# Patient Record
Sex: MALE | Race: WHITE | NOT HISPANIC OR LATINO | Employment: OTHER | URBAN - METROPOLITAN AREA
[De-identification: names, ages, dates, MRNs, and addresses within clinical notes are randomized per-mention and may not be internally consistent; named-entity substitution may affect disease eponyms.]

---

## 2018-05-21 ENCOUNTER — OFFICE VISIT (OUTPATIENT)
Dept: FAMILY MEDICINE | Facility: CLINIC | Age: 58
End: 2018-05-21
Payer: COMMERCIAL

## 2018-05-21 VITALS
WEIGHT: 184.4 LBS | HEIGHT: 74 IN | DIASTOLIC BLOOD PRESSURE: 75 MMHG | HEART RATE: 84 BPM | BODY MASS INDEX: 23.66 KG/M2 | SYSTOLIC BLOOD PRESSURE: 133 MMHG | TEMPERATURE: 98.7 F
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
## 2018-05-21 PROCEDURE — 99203 OFFICE O/P NEW LOW 30 MIN: CPT | Performed by: FAMILY MEDICINE

## 2018-05-21 RX ORDER — DOXYCYCLINE 100 MG/1
200 CAPSULE ORAL ONCE
Qty: 2 CAPSULE | Refills: 0 | Status: SHIPPED | OUTPATIENT
Start: 2018-05-21 | End: 2018-05-21

## 2018-05-21 NOTE — PATIENT INSTRUCTIONS
Start Doxycycline 200 mg orally x 1 dose. Take it close to dinner tonight.    Watch for symptoms of lyme disease still in the next 30 days or so.      Tick Bite, Antibiotic Treatment    Ticks are small arachnids that feed on the blood of rodents, rabbits, birds, deer, dogs and humans. The bite may cause a local reaction like that of a spider, with a small amount of local redness, itching and slight swelling. Sometimes there is no local reaction.  Most tick bites are harmless. But some ticks carry diseases, such as Lyme disease or Sergio Mountain spotted fever. These can be passed to people at the time of the bite. Lyme disease is of greatest concern. Right now you have no symptoms of Lyme disease or other serious reaction to the bite. It is important to watch for the warning signs, which could appear weeks to months after the tick bite.  Home care  The following guidelines can help you care for your bite at home:    If itching is a problem, avoid tight clothing and anything that heats up your skin. This includes hot showers or baths and direct sunlight. This often makes the itching worse.    An ice pack will reduce local areas of redness and itching. Make your own ice pack by putting ice cubes in a zip-top plastic bag and wrapping it in a thin towel. Over-the-counter creams containing benzocaine may help with itching.    You can use an antihistamine with diphenhydramine if your doctor did not give you another antihistamine. This medicine may be used to reduce itching if large areas of the skin are involved. It is available at drugstores and grocery stores. If symptoms continue, talk with your doctor or pharmacist about other over-the counter medicines that may be helpful.    Your doctor may prescribe antibiotics to reduce your risk of getting Lyme disease. It is very important that you take them exactly as directed until they are completely finished.  Follow-up care  Follow up with your healthcare provider, or as  advised.  Call 911  Call 911 if any of these occur:    Irregular or rapid heartbeat    Numbness, tingling, or weakness in the arms or legs  When to seek medical advice  Call your healthcare provider right away if any of these occur:  Signs of local infection. Watch for these during the next few days:    Increasing redness around the bite site    Increased pain or swelling    Fever over 100.4 F (38 C), or as directed by your healthcare provider    Fluid draining from the bite area  Signs of tick-related disease. Watch for these over the next few weeks to months:    Circular, red, ring-like rash appears at the bite area within 1 to 3 weeks    Tiredness, fever or chills, nausea or vomiting    Neck pain or stiffness, headache, or confusion    Muscle or bone aches    Joint pain or swelling, especially in the knee    Weakness on one side of the face  Date Last Reviewed: 10/1/2016    2074-4200 [employer]. [address] [place] [address]. All rights reserved. This information is not intended as a substitute for professional medical care. Always follow your healthcare professional's instructions.

## 2018-05-21 NOTE — MR AVS SNAPSHOT
After Visit Summary   5/21/2018    Noam Del Real    MRN: [id number]           Patient Information     Date Of Birth          1960        Visit Information        Provider Department      5/21/2018 6:20 PM Bandar Blue MD [facility]        Today's Diagnoses     Tick bite, initial encounter    -  1      Care Instructions    Start Doxycycline 200 mg orally x 1 dose. Take it close to dinner tonight.    Watch for symptoms of lyme disease still in the next 30 days or so.      Tick Bite, Antibiotic Treatment    Ticks are small arachnids that feed on the blood of rodents, rabbits, birds, deer, dogs and humans. The bite may cause a local reaction like that of a spider, with a small amount of local redness, itching and slight swelling. Sometimes there is no local reaction.  Most tick bites are harmless. But some ticks carry diseases, such as Lyme disease or Sergio Mountain spotted fever. These can be passed to people at the time of the bite. Lyme disease is of greatest concern. Right now you have no symptoms of Lyme disease or other serious reaction to the bite. It is important to watch for the warning signs, which could appear weeks to months after the tick bite.  Home care  The following guidelines can help you care for your bite at home:    If itching is a problem, avoid tight clothing and anything that heats up your skin. This includes hot showers or baths and direct sunlight. This often makes the itching worse.    An ice pack will reduce local areas of redness and itching. Make your own ice pack by putting ice cubes in a zip-top plastic bag and wrapping it in a thin towel. Over-the-counter creams containing benzocaine may help with itching.    You can use an antihistamine with diphenhydramine if your doctor did not give you another antihistamine. This medicine may be used to reduce itching if large areas of the skin are involved. It is available at drugstores and grocery  stores. If symptoms continue, talk with your doctor or pharmacist about other over-the counter medicines that may be helpful.    Your doctor may prescribe antibiotics to reduce your risk of getting Lyme disease. It is very important that you take them exactly as directed until they are completely finished.  Follow-up care  Follow up with your healthcare provider, or as advised.  Call 911  Call 911 if any of these occur:    Irregular or rapid heartbeat    Numbness, tingling, or weakness in the arms or legs  When to seek medical advice  Call your healthcare provider right away if any of these occur:  Signs of local infection. Watch for these during the next few days:    Increasing redness around the bite site    Increased pain or swelling    Fever over 100.4 F (38 C), or as directed by your healthcare provider    Fluid draining from the bite area  Signs of tick-related disease. Watch for these over the next few weeks to months:    Circular, red, ring-like rash appears at the bite area within 1 to 3 weeks    Tiredness, fever or chills, nausea or vomiting    Neck pain or stiffness, headache, or confusion    Muscle or bone aches    Joint pain or swelling, especially in the knee    Weakness on one side of the face  Date Last Reviewed: 10/1/2016    0906-4329 [employer]. [address] [place] [address]. All rights reserved. This information is not intended as a substitute for professional medical care. Always follow your healthcare professional's instructions.                Follow-ups after your visit        Follow-up notes from your care team     Return if symptoms worsen or fail to improve.      Who to contact     If you have questions or need follow up information about today's clinic visit or your schedule please contact [facility] directly at [phone].  Normal or non-critical lab and imaging results will be communicated to you by MyChart, letter or phone within 4 business  "days after the clinic has received the results. If you do not hear from us within 7 days, please contact the clinic through [employer] or phone. If you have a critical or abnormal lab result, we will notify you by phone as soon as possible.  Submit refill requests through [employer] or call your pharmacy and they will forward the refill request to us. Please allow 3 business days for your refill to be completed.          Additional Information About Your Visit        [employer] Information     [employer] lets you send messages to your doctor, view your test results, renew your prescriptions, schedule appointments and more. To sign up, go to [email or web address] . Click on \"Log in\" on the left side of the screen, which will take you to the Welcome page. Then click on \"Sign up Now\" on the right side of the page.     You will be asked to enter the access code listed below, as well as some personal information. Please follow the directions to create your username and password.     Your access code is: [email or web address]  Expires: 2018  6:30 PM     Your access code will  in 90 days. If you need help or a new code, please call your [facility] clinic or [phone].        Care EveryWhere ID     This is your Care EveryWhere ID. This could be used by other organizations to access your [facility] medical records  [id number]        Your Vitals Were     Pulse Temperature Height BMI (Body Mass Index)          84 98.7  F (37.1  C) (Tympanic) 6' 2.25\" (1.886 m) 23.52 kg/m2         Blood Pressure from Last 3 Encounters:   18 133/75   13 132/89    Weight from Last 3 Encounters:   18 184 lb 6.4 oz (83.6 kg)   13 188 lb (85.3 kg)              Today, you had the following     No orders found for display         Today's Medication Changes          These changes are accurate as of 18  6:30 PM.  If you have any questions, ask your nurse or doctor.               Start taking these medicines.        Dose/Directions "    doxycycline 100 MG capsule   Commonly known as:  VIBRAMYCIN   Used for:  Tick bite, initial encounter   Started by:  Bandar Blue MD        Dose:  200 mg   Take 2 capsules (200 mg) by mouth once for 1 dose   Quantity:  2 capsule   Refills:  0            Where to get your medicines      These medications were sent to [facility] - [place], [place] - [address] [facility]  [address] [facility] [address]     Phone:  [phone]     doxycycline 100 MG capsule                Primary Care Provider Office Phone # Fax #    Geovanni Alvarez [phone]       [address] [facility] [address]        Equal Access to Services     ALEX RIZVI : Hadii pallavi carroll hadchris Soze, waaxda luqadaha, qaybta kaalmada adeosmanyada, olivia roberto . So [facility] [phone].    ATENCIÓN: Si habla español, tiene a dean disposición servicios gratuitos de asistencia lingüística. Llame al [phone].    We comply with applicable federal civil rights laws and [place] laws. We do not discriminate on the basis of race, color, national origin, age, disability, sex, sexual orientation, or gender identity.            Thank you!     Thank you for choosing [facility]  for your care. Our goal is always to provide you with excellent care. Hearing back from our patients is one way we can continue to improve our services. Please take a few minutes to complete the written survey that you may receive in the mail after your visit with us. Thank you!             Your Updated Medication List - Protect others around you: Learn how to safely use, store and throw away your medicines at www.disposemymeds.org.          This list is accurate as of 5/21/18  6:30 PM.  Always use your most recent med list.                   Brand Name Dispense Instructions for use Diagnosis    doxycycline 100 MG capsule    VIBRAMYCIN    2 capsule    Take 2 capsules (200 mg) by mouth once for 1 dose     Tick bite, initial encounter       NO ACTIVE MEDICATIONS

## 2018-05-21 NOTE — PROGRESS NOTES
"  SUBJECTIVE:   Noam Del Real is a 57 year old male who presents to clinic today for the following health issues:  Chief Complaint   Patient presents with     Insect Bites     Pt here for tick bite.         Tick bite      Duration: pt found about 1 hr ago    Description (location/character/radiation): under right armpit, redness and swelling, might still be the head in there    Intensity:  Mild soreness    Accompanying signs and symptoms: redness and swelling    History (similar episodes/previous evaluation): None    Precipitating or alleviating factors: None    Therapies tried and outcome: None     Verified above history with patient.  Patient states unsure how long the tick was attached.  Patient described tick as \"tiny little maged\".    Patient states he removed it by grabbing between fingernails and pulling.       Problem list and histories reviewed & adjusted, as indicated.  Additional history: as documented    Patient Active Problem List   Diagnosis     Marijuana smoker (H)     Past Surgical History:   Procedure Laterality Date     C ANESTH,LOWER ARM SURGERY  2002    Left     DENTAL SURGERY      [place]        Social History   Substance Use Topics     Smoking status: Never Smoker     Smokeless tobacco: Never Used     Alcohol use Yes      Comment: Rarely     Family History   Problem Relation Age of Onset     Prostate Cancer Father      Removal of prostate for elevated PSA. Unsure of cancer.          Current Outpatient Prescriptions   Medication Sig Dispense Refill     doxycycline (VIBRAMYCIN) 100 MG capsule Take 2 capsules (200 mg) by mouth once for 1 dose 2 capsule 0     NO ACTIVE MEDICATIONS        No Known Allergies    Reviewed and updated as needed this visit by clinical staff  Tobacco  Allergies  Med Hx  Surg Hx  Fam Hx  Soc Hx      Reviewed and updated as needed this visit by Provider         ROS:  C: NEGATIVE for fever, chills, change in weight  INTEGUMENTARY/SKIN: see above  MUSCULOSKELETAL: see " "above  H: NEGATIVE for bleeding problems    OBJECTIVE:                                                    /75  Pulse 84  Temp 98.7  F (37.1  C) (Tympanic)  Ht 6' 2.25\" (1.886 m)  Wt 184 lb 6.4 oz (83.6 kg)  BMI 23.52 kg/m2  Body mass index is 23.52 kg/(m^2).  GEN: alert, oriented x 3, NAD  SKIN: good turgor; on the right lateral chest, patient has erythematous papule with central 2 mm hemorrhagic puncta with no visible or palpable retained FB or tick fragment, and no surrounding erythematous ring  Diagnostic test results:  Diagnostic Test Results:  none      ASSESSMENT/PLAN:                                                        ICD-10-CM    1. Tick bite, initial encounter W57.XXXA doxycycline (VIBRAMYCIN) 100 MG capsule     Patient has likely deer tick bite, likely more than 24 hours and wihtin the last 72 hours.   Discussed lyme prophylaxis and patient concurred.  Advised to observe for target rash development, fever, chills, increased joint pains or other new symptoms in next few weeks; if present return to clinic for reevaluation.  Advised full body tick exam when outdoors for long periods.      Follow up with Provider - prn   Patient Instructions   Start Doxycycline 200 mg orally x 1 dose. Take it close to dinner tonight.    Watch for symptoms of lyme disease still in the next 30 days or so.      Tick Bite, Antibiotic Treatment    Ticks are small arachnids that feed on the blood of rodents, rabbits, birds, deer, dogs and humans. The bite may cause a local reaction like that of a spider, with a small amount of local redness, itching and slight swelling. Sometimes there is no local reaction.  Most tick bites are harmless. But some ticks carry diseases, such as Lyme disease or Sergio Mountain spotted fever. These can be passed to people at the time of the bite. Lyme disease is of greatest concern. Right now you have no symptoms of Lyme disease or other serious reaction to the bite. It is important to " watch for the warning signs, which could appear weeks to months after the tick bite.  Home care  The following guidelines can help you care for your bite at home:    If itching is a problem, avoid tight clothing and anything that heats up your skin. This includes hot showers or baths and direct sunlight. This often makes the itching worse.    An ice pack will reduce local areas of redness and itching. Make your own ice pack by putting ice cubes in a zip-top plastic bag and wrapping it in a thin towel. Over-the-counter creams containing benzocaine may help with itching.    You can use an antihistamine with diphenhydramine if your doctor did not give you another antihistamine. This medicine may be used to reduce itching if large areas of the skin are involved. It is available at drugstores and grocery stores. If symptoms continue, talk with your doctor or pharmacist about other over-the counter medicines that may be helpful.    Your doctor may prescribe antibiotics to reduce your risk of getting Lyme disease. It is very important that you take them exactly as directed until they are completely finished.  Follow-up care  Follow up with your healthcare provider, or as advised.  Call 911  Call 911 if any of these occur:    Irregular or rapid heartbeat    Numbness, tingling, or weakness in the arms or legs  When to seek medical advice  Call your healthcare provider right away if any of these occur:  Signs of local infection. Watch for these during the next few days:    Increasing redness around the bite site    Increased pain or swelling    Fever over 100.4 F (38 C), or as directed by your healthcare provider    Fluid draining from the bite area  Signs of tick-related disease. Watch for these over the next few weeks to months:    Circular, red, ring-like rash appears at the bite area within 1 to 3 weeks    Tiredness, fever or chills, nausea or vomiting    Neck pain or stiffness, headache, or confusion    Muscle or bone  aches    Joint pain or swelling, especially in the knee    Weakness on one side of the face  Date Last Reviewed: 10/1/2016    2393-4606 [employer]. [address] [place] [address], [place] [address]. All rights reserved. This information is not intended as a substitute for professional medical care. Always follow your healthcare professional's instructions.            Bandar Blue MD  [facility]

## 2018-07-30 ENCOUNTER — TELEPHONE (OUTPATIENT)
Dept: FAMILY MEDICINE | Facility: CLINIC | Age: 58
End: 2018-07-30

## 2018-07-30 NOTE — LETTER
[facility]  [address] [facility] [address]  [phone]      July 30, 2018      Noam Del Real  [address] [place] [address]    Dear Noam,    I care about your health and have reviewed your health plan. I have reviewed your medical conditions, medication list, and lab results and am making recommendations based on this review, to better manage your health.    You are in particular need of attention regarding:  -Colon Cancer Screening    I am recommending that you:  -schedule a COLONOSCOPY to look for colon cancer (due every 10 years or 5 years in higher risk situations.)   Colon cancer is now the second leading cause of death in the [place] for both men and women and there are over 130,000 new cases and 50,000 deaths per year from colon cancer.  Colonoscopies can prevent 90-95% of these deaths.  Problem lesions can be removed before they ever become cancer.  This test is not only looking for cancer, but also getting rid of precancerious lesions.  If you do not wish to do a colonoscopy or cannot afford to do one, at this time, there is another option. It is called a FIT test or Fecal Immunochemical Occult Blood Test (take home stool sample kit).  It does not replace the colonoscopy for colorectal cancer screening, but it can detect hidden bleeding in the lower colon.  It does need to be repeated every year and if a positive result is obtained, you would be referred for a colonoscopy.  If you have completed either one of these tests at another facility, please have the records sent to our clinic so that we can best coordinate your care.    Here is a list of Health Maintenance topics that are due now or due soon:  Health Maintenance Due   Topic Date Due     PHQ-2 Q1 YR  11/05/1973     HIV SCREEN (SYSTEM ASSIGNED)  11/05/1979     HEPATITIS C SCREENING  11/05/1979     COLON CANCER SCREEN (SYSTEM ASSIGNED)  11/05/2011     LIPID MONITORING Q1 YEAR  01/12/2014      TETANUS IMMUNIZATION (SYSTEM ASSIGNED)  01/01/2016     ADVANCE DIRECTIVE PLANNING Q5 YRS  11/05/2016       Please call us at [phone] (or use [employer]) to address the above recommendations.     Thank you for trusting [facility] and we appreciate the opportunity to serve you.  We look forward to supporting your healthcare needs in the future.    Healthy Regards,    Dr. Bandar Blue & Care Team/  cma

## 2018-07-30 NOTE — TELEPHONE ENCOUNTER
Panel Management Review    Composite cancer screening  Chart review shows that this patient is due/due soon for the following Colonoscopy/Fecal Colorectal (FIT)  Summary:    Patient is due/failing the following:   COLONOSCOPY/FIT    Action needed:   Patient needs referral/order: colonoscopy/FIT test    Type of outreach:    Sent letter.    Questions for provider review:    None                                                                                                                                    Laura Fischer CMA..........7/30/2018 4:44 PM

## 2018-08-30 ENCOUNTER — OFFICE VISIT (OUTPATIENT)
Dept: FAMILY MEDICINE | Facility: CLINIC | Age: 58
End: 2018-08-30
Payer: COMMERCIAL

## 2018-08-30 VITALS
SYSTOLIC BLOOD PRESSURE: 154 MMHG | TEMPERATURE: 97.4 F | WEIGHT: 179 LBS | HEIGHT: 74 IN | HEART RATE: 64 BPM | DIASTOLIC BLOOD PRESSURE: 89 MMHG | OXYGEN SATURATION: 100 % | BODY MASS INDEX: 22.97 KG/M2

## 2018-08-30 DIAGNOSIS — J06.9 VIRAL UPPER RESPIRATORY TRACT INFECTION WITH COUGH: Primary | ICD-10-CM

## 2018-08-30 PROCEDURE — 99213 OFFICE O/P EST LOW 20 MIN: CPT | Performed by: PHYSICIAN ASSISTANT

## 2018-08-30 NOTE — PATIENT INSTRUCTIONS
Viral Upper Respiratory Illness (Adult)  You have a viral upper respiratory illness (URI), which is another term for the common cold. This illness is contagious during the first few days. It is spread through the air by coughing and sneezing. It may also be spread by direct contact (touching the sick person and then touching your own eyes, nose, or mouth). Frequent handwashing will decrease risk of spread. Most viral illnesses go away within 7 to 10 days with rest and simple home remedies. Sometimes the illness may last for several weeks. Antibiotics will not kill a virus, and they are generally not prescribed for this condition.    Home care    If symptoms are severe, rest at home for the first 2 to 3 days. When you resume activity, don't let yourself get too tired.    Avoid being exposed to cigarette smoke (yours or others ).    You may use acetaminophen or ibuprofen to control pain and fever, unless another medicine was prescribed. If you have chronic liver or kidney disease, have ever had a stomach ulcer or gastrointestinal bleeding, or are taking blood-thinning medicines, talk with your healthcare provider before using these medicines. Aspirin should never be given to anyone under 18 years of age who is ill with a viral infection or fever. It may cause severe liver or brain damage.    Your appetite may be poor, so a light diet is fine. Avoid dehydration by drinking 6 to 8 glasses of fluids per day (water, soft drinks, juices, tea, or soup). Extra fluids will help loosen secretions in the nose and lungs.    Over-the-counter cold medicines will not shorten the length of time you re sick, but they may be helpful for the following symptoms: cough, sore throat, and nasal and sinus congestion. (Note: Do not use decongestants if you have high blood pressure.)  Follow-up care  Follow up with your healthcare provider, or as advised.  When to seek medical advice  Call your healthcare provider right away if any of these  occur:    Cough with lots of colored sputum (mucus)    Severe headache; face, neck, or ear pain    Difficulty swallowing due to throat pain    Fever of 100.4 F (38 C) or higher, or as directed by your healthcare provider  Call 911  Call 911 if any of these occur:    Chest pain, shortness of breath, wheezing, or difficulty breathing    Coughing up blood    Inability to swallow due to throat pain  Date Last Reviewed: 9/13/2015 2000-2017 [employer]. [address] [place] [address]. All rights reserved. This information is not intended as a substitute for professional medical care. Always follow your healthcare professional's instructions.

## 2018-08-30 NOTE — PROGRESS NOTES
"SUBJECTIVE:                                                    Noam Del Real is a 57 year old male who presents to clinic today for the following health issues:             Symptoms: cc Present Absent Comment   Fever/Chills       Fatigue x      Headache  x  Mild    Muscle or Body  Aches   x Sunday body aches   Eye Irritation   x    Sneezing   x    Nasal Eyal/Drg x      Sinus Pressure/Pain   x    Dental pain   x    Sore Throat x      Swollen Glands   x    Ear Pain/Fullness   x    Cough x      Wheeze   x    Chest Discomfort   x    Shortness of breath  x     Abdominal pain   x    Emesis    x Vomited once on monday   Diarrhea   x    Other   x      Symptom duration:  1 week, increasing the last few days.    Symptom severity:     Treatments tried:  allegra, benadryl    Contacts:  no         Problem list and histories reviewed & adjusted, as indicated.  Additional history: None    ROS:  Constitutional, HEENT, cardiovascular, pulmonary, gi and gu systems are negative, except as otherwise noted.    OBJECTIVE:                                                    /89  Pulse 64  Temp 97.4  F (36.3  C) (Tympanic)  Ht 6' 2.25\" (1.886 m)  Wt 179 lb (81.2 kg)  SpO2 100%  BMI 22.83 kg/m2 Body mass index is 22.83 kg/(m^2).   GEN: Well developed, well nourished in NAD.  HEENT: Normocephalic. Eyes: + conjunctival flushing noted. EARS: TMs WNL, Canals clear.  Nose: Edematous mucosa without lesion. Clear rhinorrhea. Mouth/Pharynx: No cobblestoning and telangiectasia. No Percussed Sinus tenderness.  NECK: Supple with no anterior cervical lymphadenopathy.  No Thyromegaly  RESP: CTA with good air entry all fields.  SKIN: No Exanthem noted.         ASSESSMENT/PLAN:                                                    1. Viral upper respiratory tract infection with cough    Continue supportive OTC measures.  Follow up if symptoms should persist, change or worsen.  Patient amenable to this follow up plan.       Mirna ELLSWORTH " [facility]

## 2018-08-30 NOTE — MR AVS SNAPSHOT
After Visit Summary   8/30/2018    Noam Del Real    MRN: [id number]           Patient Information     Date Of Birth          1960        Visit Information        Provider Department      8/30/2018 9:20 AM Karon Posada PA-C [facility]        Today's Diagnoses     Viral upper respiratory tract infection with cough    -  1      Care Instructions      Viral Upper Respiratory Illness (Adult)  You have a viral upper respiratory illness (URI), which is another term for the common cold. This illness is contagious during the first few days. It is spread through the air by coughing and sneezing. It may also be spread by direct contact (touching the sick person and then touching your own eyes, nose, or mouth). Frequent handwashing will decrease risk of spread. Most viral illnesses go away within 7 to 10 days with rest and simple home remedies. Sometimes the illness may last for several weeks. Antibiotics will not kill a virus, and they are generally not prescribed for this condition.    Home care    If symptoms are severe, rest at home for the first 2 to 3 days. When you resume activity, don't let yourself get too tired.    Avoid being exposed to cigarette smoke (yours or others ).    You may use acetaminophen or ibuprofen to control pain and fever, unless another medicine was prescribed. If you have chronic liver or kidney disease, have ever had a stomach ulcer or gastrointestinal bleeding, or are taking blood-thinning medicines, talk with your healthcare provider before using these medicines. Aspirin should never be given to anyone under 18 years of age who is ill with a viral infection or fever. It may cause severe liver or brain damage.    Your appetite may be poor, so a light diet is fine. Avoid dehydration by drinking 6 to 8 glasses of fluids per day (water, soft drinks, juices, tea, or soup). Extra fluids will help loosen secretions in the nose and lungs.    Over-the-counter  cold medicines will not shorten the length of time you re sick, but they may be helpful for the following symptoms: cough, sore throat, and nasal and sinus congestion. (Note: Do not use decongestants if you have high blood pressure.)  Follow-up care  Follow up with your healthcare provider, or as advised.  When to seek medical advice  Call your healthcare provider right away if any of these occur:    Cough with lots of colored sputum (mucus)    Severe headache; face, neck, or ear pain    Difficulty swallowing due to throat pain    Fever of 100.4 F (38 C) or higher, or as directed by your healthcare provider  Call 911  Call 911 if any of these occur:    Chest pain, shortness of breath, wheezing, or difficulty breathing    Coughing up blood    Inability to swallow due to throat pain  Date Last Reviewed: 9/13/2015 2000-2017 [employer]. [address] [place] [address]. All rights reserved. This information is not intended as a substitute for professional medical care. Always follow your healthcare professional's instructions.                Follow-ups after your visit        Who to contact     Normal or non-critical lab and imaging results will be communicated to you by MyChart, letter or phone within 4 business days after the clinic has received the results. If you do not hear from us within 7 days, please contact the clinic through MyChart or phone. If you have a critical or abnormal lab result, we will notify you by phone as soon as possible.  Submit refill requests through [employer] or call your pharmacy and they will forward the refill request to us. Please allow 3 business days for your refill to be completed.          If you need to speak with a  for additional information , please call: [phone]             Additional Information About Your Visit        Care EveryWhere ID     This is your Care EveryWhere ID. This could be used by other organizations to access your  "Printer medical records  [id number]        Your Vitals Were     Pulse Temperature Height Pulse Oximetry BMI (Body Mass Index)       64 97.4  F (36.3  C) (Tympanic) 6' 2.25\" (1.886 m) 100% 22.83 kg/m2        Blood Pressure from Last 3 Encounters:   08/30/18 154/89   05/21/18 133/75   01/03/13 132/89    Weight from Last 3 Encounters:   08/30/18 179 lb (81.2 kg)   05/21/18 184 lb 6.4 oz (83.6 kg)   01/03/13 188 lb (85.3 kg)              Today, you had the following     No orders found for display       Primary Care Provider Office Phone # Fax #    Geovanni Alvarez [phone] [address] [facility] [address]        Equal Access to Services     [facility]: Hadii pallavi carroll hadasho Soomaali, waaxda luqadaha, qaybta kaalmada adeegyada, olivia arredondo haymercy roberto . So [facility] [phone].    ATENCIÓN: Si habla español, tiene a dean disposición servicios gratuitos de asistencia lingüística. Llame al [phone].    We comply with applicable federal civil rights laws and [place] laws. We do not discriminate on the basis of race, color, national origin, age, disability, sex, sexual orientation, or gender identity.            Thank you!     Thank you for choosing [facility]  for your care. Our goal is always to provide you with excellent care. Hearing back from our patients is one way we can continue to improve our services. Please take a few minutes to complete the written survey that you may receive in the mail after your visit with us. Thank you!             Your Updated Medication List - Protect others around you: Learn how to safely use, store and throw away your medicines at www.disposemymeds.org.          This list is accurate as of 8/30/18  9:41 AM.  Always use your most recent med list.                   Brand Name Dispense Instructions for use Diagnosis    NO ACTIVE MEDICATIONS             "

## 2019-02-19 ENCOUNTER — TELEPHONE (OUTPATIENT)
Dept: FAMILY MEDICINE | Facility: CLINIC | Age: 59
End: 2019-02-19

## 2019-02-19 NOTE — TELEPHONE ENCOUNTER
Panel Management Review      Composite cancer screening  Chart review shows that this patient is due/due soon for the following Colonoscopy and Fecal Colorectal (FIT)  Summary:    Patient is due/failing the following:   COLONOSCOPY and FIT    Action needed:   Patient needs referral/order: colonoscopy/fit test    Type of outreach:    Sent letter.    Questions for provider review:    None                                                                                                                                    Miri Viera CMA

## 2019-02-19 NOTE — LETTER
February 19, 2019      Noam Del Real  [address] [place] [id number]          At this time you are due for a Colon Cancer Screening. Here is some information regarding this testing.     Recommended every 5-10 years, depending on your history, in order to prevent and detect colon cancer at its earliest stages.  Colon cancer is now the second leading cause of death in the [place] for both men and women and there are over 130,000 new cases and 50,000 deaths per year from colon cancer.  Colonoscopies can prevent 90-95% of these deaths.  Problem lesions can be removed before they ever become cancer. This test is not only looking for cancer, but also getting rid of precancerious lesions. You are usually given some sedation which makes the test very comfortable for most people.      If you do not wish to do a colonoscopy or cannot afford to do one, at this time, there is another option. It is called a FIT test or Fecal Immunochemical Occult Blood Test (take home stool sample kit).  It does not replace the colonoscopy for colorectal cancer screening, but it can detect hidden bleeding in the lower colon.  It does need to be repeated every year and if a positive result is obtained, you would be referred for a colonoscopy. The FIT test is really easy to do and does not require any  diet or medication restrictions and involves only one collection sample.      If you have completed either one of these tests or had a flexible sigmoidoscopy in the past five years at another facility, please have the records sent to our clinic so that we can best coordinate your care and update your chart.  Please call us if you have questions or would like arrange either to do a colonoscopy or obtain the necessary test kit for the Fecal Occult Test.      Sincerely,        Bandar Blue MD

## 2019-04-16 ENCOUNTER — OFFICE VISIT (OUTPATIENT)
Dept: DERMATOLOGY | Facility: CLINIC | Age: 59
End: 2019-04-16
Payer: COMMERCIAL

## 2019-04-16 VITALS — OXYGEN SATURATION: 98 % | DIASTOLIC BLOOD PRESSURE: 93 MMHG | HEART RATE: 71 BPM | SYSTOLIC BLOOD PRESSURE: 148 MMHG

## 2019-04-16 DIAGNOSIS — D48.5 NEOPLASM OF UNCERTAIN BEHAVIOR OF SKIN: Primary | ICD-10-CM

## 2019-04-16 DIAGNOSIS — L82.1 SEBORRHEIC KERATOSIS: ICD-10-CM

## 2019-04-16 PROCEDURE — 11102 TANGNTL BX SKIN SINGLE LES: CPT | Performed by: PHYSICIAN ASSISTANT

## 2019-04-16 PROCEDURE — 88305 TISSUE EXAM BY PATHOLOGIST: CPT | Mod: TC | Performed by: PHYSICIAN ASSISTANT

## 2019-04-16 PROCEDURE — 99203 OFFICE O/P NEW LOW 30 MIN: CPT | Mod: 25 | Performed by: PHYSICIAN ASSISTANT

## 2019-04-16 NOTE — PROGRESS NOTES
Noam Del Real is a 58 year old year old male patient here today for black spot on back. Noticed it yesterday. Denies any pain or bleeding. He notes a long history of sun exposure growing up in [place]. Patient has no other skin complaints today.  Remainder of the HPI, Meds, PMH, Allergies, FH, and SH was reviewed in chart.    Pertinent Hx:   No personal or family history of skin cancer.   History reviewed. No pertinent past medical history.    Past Surgical History:   Procedure Laterality Date     C ANESTH,LOWER ARM SURGERY  2002    Left     DENTAL SURGERY      [place]         Family History   Problem Relation Age of Onset     Prostate Cancer Father         Removal of prostate for elevated PSA. Unsure of cancer.        Social History     Socioeconomic History     Marital status:      Spouse name: Belkis Ledesma     Number of children: 0     Years of education: 12+     Highest education level: Not on file   Occupational History     Employer: OTHER   Social Needs     Financial resource strain: Not on file     Food insecurity:     Worry: Not on file     Inability: Not on file     Transportation needs:     Medical: Not on file     Non-medical: Not on file   Tobacco Use     Smoking status: Never Smoker     Smokeless tobacco: Never Used   Substance and Sexual Activity     Alcohol use: Yes     Comment: Rarely     Drug use: No     Sexual activity: Yes     Partners: Female   Lifestyle     Physical activity:     Days per week: Not on file     Minutes per session: Not on file     Stress: Not on file   Relationships     Social connections:     Talks on phone: Not on file     Gets together: Not on file     Attends [employer] service: Not on file     Active member of club or organization: Not on file     Attends meetings of clubs or organizations: Not on file     Relationship status: Not on file     Intimate partner violence:     Fear of current or ex partner: Not on file     Emotionally abused: Not on file     Physically  abused: Not on file     Forced sexual activity: Not on file   Other Topics Concern     Parent/sibling w/ CABG, MI or angioplasty before 65F 55M? No   Social History Narrative     Not on file       Outpatient Encounter Medications as of 4/16/2019   Medication Sig Dispense Refill     NO ACTIVE MEDICATIONS        No facility-administered encounter medications on file as of 4/16/2019.              Review Of Systems  Skin: As above  Eyes: negative  Ears/Nose/Throat: negative  Respiratory: No shortness of breath, dyspnea on exertion, cough, or hemoptysis  Cardiovascular: negative  Gastrointestinal: negative  Genitourinary: negative  Musculoskeletal: negative  Neurologic: negative  Psychiatric: negative  Hematologic/Lymphatic/Immunologic: negative  Endocrine: negative      O:   NAD, WDWN, Alert & Oriented, Mood & Affect wnl, Vitals stable   Here today alone   BP (!) 148/93   Pulse 71   SpO2 98%    General appearance normal   Vitals stable   Alert, oriented and in no acute distress     0.6 cm blackish brown macule on left mid back   Brown stuck on papules on back    Eyes: Conjunctivae/lids:Normal     ENT: Lips: normal    MSK:Normal    Pulm: Breathing Normal    Neuro/Psych: Orientation:Normal; Mood/Affect:Normal    A/P:  1. R/O melanoma on left mid back  TANGENTIAL BIOPSY SENT OUT:  After consent, anesthesia with LEC and prep, tangential excision performed and specimen sent out for permanent section histology.  No complications and routine wound care. Patient told to call our office in 1-2 weeks for result.      2. Seborrheic keratosis  BENIGN LESIONS DISCUSSED WITH PATIENT:  I discussed the specifics of tumor, prognosis, and genetics of benign lesions.  I explained that treatment of these lesions would be purely cosmetic and not medically neccessary.  I discussed with patient different removal options including excision, cautery and /or laser.      Nature and genetics of benign skin lesions dicussed with patient.  Signs  and Symptoms of skin cancer discussed with patient.  ABCDEs of melanoma reviewed with patient.  Patient encouraged to perform monthly skin exams.  UV precautions reviewed with patient.  Risks of non-melanoma skin cancer discussed with patient   Return to clinic pending biopsy results.   Noam to follow up with Primary Care provider regarding elevated blood pressure.

## 2019-04-16 NOTE — LETTER
4/16/2019         RE: Noam Del Real  [address] [place] [id number]        Dear Colleague,    Thank you for referring your patient, Noam Del Real, to the [facility]. Please see a copy of my visit note below.    Noam Del Real is a 58 year old year old male patient here today for black spot on back. Noticed it yesterday. Denies any pain or bleeding. He notes a long history of sun exposure growing up in [place]. Patient has no other skin complaints today.  Remainder of the HPI, Meds, PMH, Allergies, FH, and SH was reviewed in chart.    Pertinent Hx:   No personal or family history of skin cancer.   History reviewed. No pertinent past medical history.    Past Surgical History:   Procedure Laterality Date     C ANESTH,LOWER ARM SURGERY  2002    Left     DENTAL SURGERY      [place]         Family History   Problem Relation Age of Onset     Prostate Cancer Father         Removal of prostate for elevated PSA. Unsure of cancer.        Social History     Socioeconomic History     Marital status:      Spouse name: Belkis Ledesma     Number of children: 0     Years of education: 12+     Highest education level: Not on file   Occupational History     Employer: OTHER   Social Needs     Financial resource strain: Not on file     Food insecurity:     Worry: Not on file     Inability: Not on file     Transportation needs:     Medical: Not on file     Non-medical: Not on file   Tobacco Use     Smoking status: Never Smoker     Smokeless tobacco: Never Used   Substance and Sexual Activity     Alcohol use: Yes     Comment: Rarely     Drug use: No     Sexual activity: Yes     Partners: Female   Lifestyle     Physical activity:     Days per week: Not on file     Minutes per session: Not on file     Stress: Not on file   Relationships     Social connections:     Talks on phone: Not on file     Gets together: Not on file     Attends [employer] service: Not on file     Active member of club or organization: Not on  file     Attends meetings of clubs or organizations: Not on file     Relationship status: Not on file     Intimate partner violence:     Fear of current or ex partner: Not on file     Emotionally abused: Not on file     Physically abused: Not on file     Forced sexual activity: Not on file   Other Topics Concern     Parent/sibling w/ CABG, MI or angioplasty before 65F 55M? No   Social History Narrative     Not on file       Outpatient Encounter Medications as of 4/16/2019   Medication Sig Dispense Refill     NO ACTIVE MEDICATIONS        No facility-administered encounter medications on file as of 4/16/2019.              Review Of Systems  Skin: As above  Eyes: negative  Ears/Nose/Throat: negative  Respiratory: No shortness of breath, dyspnea on exertion, cough, or hemoptysis  Cardiovascular: negative  Gastrointestinal: negative  Genitourinary: negative  Musculoskeletal: negative  Neurologic: negative  Psychiatric: negative  Hematologic/Lymphatic/Immunologic: negative  Endocrine: negative      O:   NAD, WDWN, Alert & Oriented, Mood & Affect wnl, Vitals stable   Here today alone   BP (!) 148/93   Pulse 71   SpO2 98%    General appearance normal   Vitals stable   Alert, oriented and in no acute distress     0.6 cm blackish brown macule on left mid back   Brown stuck on papules on back    Eyes: Conjunctivae/lids:Normal     ENT: Lips: normal    MSK:Normal    Pulm: Breathing Normal    Neuro/Psych: Orientation:Normal; Mood/Affect:Normal    A/P:  1. R/O melanoma on left mid back  TANGENTIAL BIOPSY SENT OUT:  After consent, anesthesia with LEC and prep, tangential excision performed and specimen sent out for permanent section histology.  No complications and routine wound care. Patient told to call our office in 1-2 weeks for result.      2. Seborrheic keratosis  BENIGN LESIONS DISCUSSED WITH PATIENT:  I discussed the specifics of tumor, prognosis, and genetics of benign lesions.  I explained that treatment of these  lesions would be purely cosmetic and not medically neccessary.  I discussed with patient different removal options including excision, cautery and /or laser.      Nature and genetics of benign skin lesions dicussed with patient.  Signs and Symptoms of skin cancer discussed with patient.  ABCDEs of melanoma reviewed with patient.  Patient encouraged to perform monthly skin exams.  UV precautions reviewed with patient.  Risks of non-melanoma skin cancer discussed with patient   Return to clinic pending biopsy results.   Noam to follow up with Primary Care provider regarding elevated blood pressure.           Again, thank you for allowing me to participate in the care of your patient.        Sincerely,        Rosa Camilo PA-C

## 2019-04-16 NOTE — PATIENT INSTRUCTIONS
Wound Care Instructions     FOR SUPERFICIAL WOUNDS     [facility] [phone]    [facility] [phone]                       AFTER 24 HOURS YOU SHOULD REMOVE THE BANDAGE AND BEGIN DAILY DRESSING CHANGES AS FOLLOWS:     1) Remove Dressing.     2) Clean and dry the area with tap water using a Q-tip or sterile gauze pad.     3) Apply Vaseline, Aquaphor, Polysporin ointment or Bacitracin ointment over entire wound.  Do NOT use Neosporin ointment.     4) Cover the wound with a band-aid, or a sterile non-stick gauze pad and micropore paper tape      REPEAT THESE INSTRUCTIONS AT LEAST ONCE A DAY UNTIL THE WOUND HAS COMPLETELY HEALED.    It is an old wives tale that a wound heals better when it is exposed to air and allowed to dry out. The wound will heal faster with a better cosmetic result if it is kept moist with ointment and covered with a bandage.    **Do not let the wound dry out.**      Supplies Needed:      *Cotton tipped applicators (Q-tips)    *Polysporin Ointment or Bacitracin Ointment (NOT NEOSPORIN)    *Band-aids or non-stick gauze pads and micropore paper tape.      PATIENT INFORMATION:    During the healing process you will notice a number of changes. All wounds develop a small halo of redness surrounding the wound.  This means healing is occurring. Severe itching with extensive redness usually indicates sensitivity to the ointment or bandage tape used to dress the wound.  You should call our office if this develops.      Swelling  and/or discoloration around your surgical site is common, particularly when performed around the eye.    All wounds normally drain.  The larger the wound the more drainage there will be.  After 7-10 days, you will notice the wound beginning to shrink and new skin will begin to grow.  The wound is healed when you can see skin has formed over the entire area.  A healed wound has a healthy, shiny look to the surface and is red to dark pink in color  to normalize.  Wounds may take approximately 4-6 weeks to heal.  Larger wounds may take 6-8 weeks.  After the wound is healed you may discontinue dressing changes.    You may experience a sensation of tightness as your wound heals. This is normal and will gradually subside.    Your healed wound may be sensitive to temperature changes. This sensitivity improves with time, but if you re having a lot of discomfort, try to avoid temperature extremes.    Patients frequently experience itching after their wound appears to have healed because of the continue healing under the skin.  Plain Vaseline will help relieve the itching.        POSSIBLE COMPLICATIONS    BLEEDIN. Leave the bandage in place.  2. Use tightly rolled up gauze or a cloth to apply direct pressure over the bandage for 30  minutes.  3. Reapply pressure for an additional 30 minutes if necessary  4. Use additional gauze and tape to maintain pressure once the bleeding has stopped.

## 2019-04-19 LAB — COPATH REPORT: NORMAL

## 2019-04-22 ENCOUNTER — TELEPHONE (OUTPATIENT)
Dept: DERMATOLOGY | Facility: CLINIC | Age: 59
End: 2019-04-22

## 2019-04-22 NOTE — TELEPHONE ENCOUNTER
Patient called looking for biopsy results.     Will ask another Dermatology Provider to call patient in Rosa Bacon PA-C's absence.     Deisy Pendleton RN

## 2019-04-22 NOTE — TELEPHONE ENCOUNTER
Reason for Call:  Request for results:    Name of test or procedure: Pathology results    Date of test of procedure: 04/19/2019    Location of the test or procedure:  [place]    OK to leave the result message on voice mail or with a family member? Wife, Belkis, calling (no consent on file to communicate)    Phone number Patient can be reached at:  [phone] (pt's cell)    Additional comments: Returning call from provider    Call taken on 4/22/2019 at 9:48 AM by Denise Behrendt

## 2019-04-24 ENCOUNTER — OFFICE VISIT (OUTPATIENT)
Dept: DERMATOLOGY | Facility: CLINIC | Age: 59
End: 2019-04-24
Payer: COMMERCIAL

## 2019-04-24 VITALS — HEART RATE: 72 BPM | OXYGEN SATURATION: 99 % | DIASTOLIC BLOOD PRESSURE: 72 MMHG | SYSTOLIC BLOOD PRESSURE: 147 MMHG

## 2019-04-24 DIAGNOSIS — C43.59 MALIGNANT MELANOMA OF BACK (H): Primary | ICD-10-CM

## 2019-04-24 PROCEDURE — 88341 IMHCHEM/IMCYTCHM EA ADD ANTB: CPT | Mod: 59 | Performed by: DERMATOLOGY

## 2019-04-24 PROCEDURE — 17313 MOHS 1 STAGE T/A/L: CPT | Performed by: DERMATOLOGY

## 2019-04-24 PROCEDURE — 13101 CMPLX RPR TRUNK 2.6-7.5 CM: CPT | Mod: 51 | Performed by: DERMATOLOGY

## 2019-04-24 PROCEDURE — 88342 IMHCHEM/IMCYTCHM 1ST ANTB: CPT | Mod: 59 | Performed by: DERMATOLOGY

## 2019-04-24 NOTE — LETTER
4/24/2019         RE: Noam Del Real  [address] [place] [id number]        Dear Colleague,    Thank you for referring your patient, Noam Del Real, to the [facility]. Please see a copy of my visit note below.    Noam Del Real is a 58 year old year old male patient here today for evaluation and managment of 0.5mm melanoma on left back.  Patient reports the following modifying factors none.  Associated symptoms: none.  Patient has no other skin complaints today.  Remainder of the HPI, Meds, PMH, Allergies, FH, and SH was reviewed in chart.      Past Medical History:   Diagnosis Date     Malignant melanoma (H)        Past Surgical History:   Procedure Laterality Date     C ANESTH,LOWER ARM SURGERY  2002    Left     DENTAL SURGERY      [place]         Family History   Problem Relation Age of Onset     Prostate Cancer Father         Removal of prostate for elevated PSA. Unsure of cancer.        Social History     Socioeconomic History     Marital status:      Spouse name: Belkis Ledesma     Number of children: 0     Years of education: 12+     Highest education level: Not on file   Occupational History     Employer: OTHER   Social Needs     Financial resource strain: Not on file     Food insecurity:     Worry: Not on file     Inability: Not on file     Transportation needs:     Medical: Not on file     Non-medical: Not on file   Tobacco Use     Smoking status: Never Smoker     Smokeless tobacco: Never Used   Substance and Sexual Activity     Alcohol use: Yes     Comment: Rarely     Drug use: No     Sexual activity: Yes     Partners: Female   Lifestyle     Physical activity:     Days per week: Not on file     Minutes per session: Not on file     Stress: Not on file   Relationships     Social connections:     Talks on phone: Not on file     Gets together: Not on file     Attends [employer] service: Not on file     Active member of club or organization: Not on file     Attends meetings of clubs or  organizations: Not on file     Relationship status: Not on file     Intimate partner violence:     Fear of current or ex partner: Not on file     Emotionally abused: Not on file     Physically abused: Not on file     Forced sexual activity: Not on file   Other Topics Concern     Parent/sibling w/ CABG, MI or angioplasty before 65F 55M? No   Social History Narrative     Not on file       Outpatient Encounter Medications as of 4/24/2019   Medication Sig Dispense Refill     NO ACTIVE MEDICATIONS        No facility-administered encounter medications on file as of 4/24/2019.              Review Of Systems  Skin: As above  Eyes: negative  Ears/Nose/Throat: negative  Respiratory: No shortness of breath, dyspnea on exertion, cough, or hemoptysis  Cardiovascular: negative  Gastrointestinal: negative  Genitourinary: negative  Musculoskeletal: negative  Neurologic: negative  Psychiatric: negative  Hematologic/Lymphatic/Immunologic: negative  Endocrine: negative      O:   NAD, WDWN, Alert & Oriented, Mood & Affect wnl, Vitals stable   Here today alone   /72   Pulse 72   SpO2 99%    General appearance normal   Vitals stable   Alert, oriented and in no acute distress      Following lymph nodes palpated: Occipital, Cervical, Supraclavicular , axillary, inguinal no lad   L mid back 1cm red plaque      Eyes: Conjunctivae/lids:Normal     ENT: Lips, buccal mucosa, tongue: normal    MSK:Normal    Cardiovascular: peripheral edema none    Pulm: Breathing Normal    Lymph Nodes: No Head and Neck Lymphadenopathy     Neuro/Psych: Orientation:Normal; Mood/Affect:Normal      A/P:  1. L back melanoma 0.5mm  MELANOMA DISCUSSED WITH PATIENT:  I discussed the specifics of tumor, prognosis, metachronous melanoma, self exam, and genetics with the patient. I explained the need for monthly skin exams including and taught the patient how to do this. Patient was asked about new or changing moles . I discussed with patient signs and symptoms that  could arise in the setting of recurrent locoregional or metastatic disease. In addition, the need to undergo every 4 month dermatologic full skin survey and evaluation given that patients with a diagnosis of melanoma are at risk of recurrence (local and distant) and of subsequent de pablo melanoma.  . I reviewed treatment options, including a discussion of wide excision (the gold standard) versus Mohs surgery with MART-1 immunostains.     Note: MART-1 (Melanoma Antigen Recognized by T-cells) antibody immunostaining was used during Mohs surgery as per standard protocol, in addition to routine processing of all specimens with hematoxylin and eosin. The peripheral margins/edges were processed with the MART-1 stain (4 specimens total). The center was examined with hematoxylin & eosin and MART-1 immunostains. The patient was informed of the procedure and its risk/benefits during the consent for the procedure.    One or more of the reagents used in immunohistochemical testing in this case may not have been cleared or approved by the U.S. Food and Drug Administration (FDA). The FDA has determined that such clearance or approval is not necessary. These tests are used for clinical purposes. They should not be regarded as investigational or for research. These reagents  performance characteristics have been determined by [facility]. This laboratory is certified under the Clinical Laboratory Improvement Amendments of 1988 (CLIA-88) as qualified to perform high complexity clinical laboratory testing.    [facility] decision for [facility]s surgery was made. Indication for Mohs was aggressive histology. Patient confirmed the site with Dr. Beltran. After anesthesia with LEC, the tumor was excised using standard Mohs technique in 1 stages(s).  MART 1 stains were performed on 4 specimens. CLEAR MARGINS OBTAINED and Final defect size was 2 x 2.3 cm.   REPAIR COMPLEX: Because of the tightness of the  surrounding skin and Because of the size and full thickness nature of the defect, a complex closure was planned. After LEC anesthesia and prep, Burow's triangles were excised in the relaxed skin tension lines. The wound edges were widely undermined by dissection in the subcutaneous plane until adequate tissue mobility was obtained. Hemostasis was obtained. The wound edges were closed in a layered fashion using Vicryl and Fast Absorbing Plain Gut sutures. Postoperative length was 5 cm.   EBL minimal; complications none; wound care routine.  The patient was discharged in good condition and will return in one week for wound evaluation.    Freda discussed with patient sent out today    BENIGN LESIONS DISCUSSED WITH PATIENT:  I discussed the specifics of tumor, prognosis, and genetics of benign lesions.  I explained that treatment of these lesions would be purely cosmetic and not medically neccessary.  I discussed with patient different removal options including excision, cautery and /or laser.      Nature and genetics of benign skin lesions dicussed with patient.  Signs and Symptoms of skin cancer discussed with patient.  ABCDEs of melanoma reviewed with patient.  Patient encouraged to perform monthly skin exams.  UV precautions reviewed with patient.  Patient to follow up with Primary Care provider regarding elevated blood pressure.  Skin care regimen reviewed with patient: Eliminate harsh soaps, i.e. Dial, zest, irsih spring; Mild soaps such as Cetaphil or Dove sensitive skin, avoid hot or cold showers, aggressive use of emollients including vanicream, cetaphil or cerave discussed with patient.    Risks of non-melanoma skin cancer discussed with patient   Return to clinic 4 months    Again, thank you for allowing me to participate in the care of your patient.        Sincerely,        Mayito Beltran MD

## 2019-04-24 NOTE — PROGRESS NOTES
Noam Del Real is a 58 year old year old male patient here today for evaluation and managment of 0.5mm melanoma on left back.  Patient reports the following modifying factors none.  Associated symptoms: none.  Patient has no other skin complaints today.  Remainder of the HPI, Meds, PMH, Allergies, FH, and SH was reviewed in chart.      Past Medical History:   Diagnosis Date     Malignant melanoma (H)        Past Surgical History:   Procedure Laterality Date     C ANESTH,LOWER ARM SURGERY  2002    Left     D[place]AL SURGERY      [place]         Family History   Problem Relation Age of Onset     Prostate Cancer Father         Removal of prostate for elevated PSA. Unsure of cancer.        Social History     Socioeconomic History     Marital status:      Spouse name: Belkis Ledesma     Number of children: 0     Years of education: 12+     Highest education level: Not on file   Occupational History     Employer: OTHER   Social Needs     Financial resource strain: Not on file     Food insecurity:     Worry: Not on file     Inability: Not on file     Transportation needs:     Medical: Not on file     Non-medical: Not on file   Tobacco Use     Smoking status: Never Smoker     Smokeless tobacco: Never Used   Substance and Sexual Activity     Alcohol use: Yes     Comment: Rarely     Drug use: No     Sexual activity: Yes     Partners: Female   Lifestyle     Physical activity:     Days per week: Not on file     Minutes per session: Not on file     Stress: Not on file   Relationships     Social connections:     Talks on phone: Not on file     Gets together: Not on file     Attends [employer] service: Not on file     Active member of club or organization: Not on file     Attends meetings of clubs or organizations: Not on file     Relationship status: Not on file     Intimate partner violence:     Fear of current or ex partner: Not on file     Emotionally abused: Not on file     Physically abused: Not on file     Forced  sexual activity: Not on file   Other Topics Concern     Parent/sibling w/ CABG, MI or angioplasty before 65F 55M? No   Social History Narrative     Not on file       Outpatient Encounter Medications as of 4/24/2019   Medication Sig Dispense Refill     NO ACTIVE MEDICATIONS        No facility-administered encounter medications on file as of 4/24/2019.              Review Of Systems  Skin: As above  Eyes: negative  Ears/Nose/Throat: negative  Respiratory: No shortness of breath, dyspnea on exertion, cough, or hemoptysis  Cardiovascular: negative  Gastrointestinal: negative  Genitourinary: negative  Musculoskeletal: negative  Neurologic: negative  Psychiatric: negative  Hematologic/Lymphatic/Immunologic: negative  Endocrine: negative      O:   NAD, WDWN, Alert & Oriented, Mood & Affect wnl, Vitals stable   Here today alone   /72   Pulse 72   SpO2 99%    General appearance normal   Vitals stable   Alert, oriented and in no acute distress      Following lymph nodes palpated: Occipital, Cervical, Supraclavicular , axillary, inguinal no lad   L mid back 1cm red plaque      Eyes: Conjunctivae/lids:Normal     ENT: Lips, buccal mucosa, tongue: normal    MSK:Normal    Cardiovascular: peripheral edema none    Pulm: Breathing Normal    Lymph Nodes: No Head and Neck Lymphadenopathy     Neuro/Psych: Orientation:Normal; Mood/Affect:Normal      A/P:  1. L back melanoma 0.5mm  MELANOMA DISCUSSED WITH PATIENT:  I discussed the specifics of tumor, prognosis, metachronous melanoma, self exam, and genetics with the patient. I explained the need for monthly skin exams including and taught the patient how to do this. Patient was asked about new or changing moles . I discussed with patient signs and symptoms that could arise in the setting of recurrent locoregional or metastatic disease. In addition, the need to undergo every 4 month dermatologic full skin survey and evaluation given that patients with a diagnosis of melanoma are at  risk of recurrence (local and distant) and of subsequent de pablo melanoma.  . I reviewed treatment options, including a discussion of wide excision (the gold standard) versus Mohs surgery with MART-1 immunostains.     Note: MART-1 (Melanoma Antigen Recognized by T-cells) antibody immunostaining was used during Mohs surgery as per standard protocol, in addition to routine processing of all specimens with hematoxylin and eosin. The peripheral margins/edges were processed with the MART-1 stain (4 specimens total). The center was examined with hematoxylin & eosin and MART-1 immunostains. The patient was informed of the procedure and its risk/benefits during the consent for the procedure.    One or more of the reagents used in immunohistochemical testing in this case may not have been cleared or approved by the U.S. Food and Drug Administration (FDA). The FDA has determined that such clearance or approval is not necessary. These tests are used for clinical purposes. They should not be regarded as investigational or for research. These reagents  performance characteristics have been determined by [facility]. This laboratory is certified under the Clinical Laboratory Improvement Amendments of 1988 (CLIA-88) as qualified to perform high complexity clinical laboratory testing.    [facility] decision for Mohs surgery was made. Indication for Mohs was aggressive histology. Patient confirmed the site with Dr. Beltran. After anesthesia with LEC, the tumor was excised using standard Mohs technique in 1 stages(s).  MART 1 stains were performed on 4 specimens. CLEAR MARGINS OBTAINED and Final defect size was 2 x 2.3 cm.   REPAIR COMPLEX: Because of the tightness of the surrounding skin and Because of the size and full thickness nature of the defect, a complex closure was planned. After LEC anesthesia and prep, Burow's triangles were excised in the relaxed skin tension lines. The wound edges  were widely undermined by dissection in the subcutaneous plane until adequate tissue mobility was obtained. Hemostasis was obtained. The wound edges were closed in a layered fashion using Vicryl and Fast Absorbing Plain Gut sutures. Postoperative length was 5 cm.   EBL minimal; complications none; wound care routine.  The patient was discharged in good condition and will return in one week for wound evaluation.    Freda discussed with patient sent out today    BENIGN LESIONS DISCUSSED WITH PATIENT:  I discussed the specifics of tumor, prognosis, and genetics of benign lesions.  I explained that treatment of these lesions would be purely cosmetic and not medically neccessary.  I discussed with patient different removal options including excision, cautery and /or laser.      Nature and genetics of benign skin lesions dicussed with patient.  Signs and Symptoms of skin cancer discussed with patient.  ABCDEs of melanoma reviewed with patient.  Patient encouraged to perform monthly skin exams.  UV precautions reviewed with patient.  Patient to follow up with Primary Care provider regarding elevated blood pressure.  Skin care regimen reviewed with patient: Eliminate harsh soaps, i.e. Dial, zest, irsih spring; Mild soaps such as Cetaphil or Dove sensitive skin, avoid hot or cold showers, aggressive use of emollients including vanicream, cetaphil or cerave discussed with patient.    Risks of non-melanoma skin cancer discussed with patient   Return to clinic 4 months

## 2019-04-24 NOTE — PATIENT INSTRUCTIONS
Sutured Wound Care     [facility]: [phone]    [facility]: [phone]          ? No strenuous activity for 48 hours. Resume moderate activity in 48 hours. No heavy exercising until you are seen for follow up in one week.     ? Take Tylenol as needed for discomfort.                         ? Do not drink alcoholic beverages for 48 hours.     ? Keep the pressure bandage in place for 24 hours. If the bandage becomes blood tinged or loose, reinforce it with gauze and tape.        (Refer to the reverse side of this page for management of bleeding).    ? Remove pressure bandage in 24 hours     ? Leave the flat bandage in place until your follow up appointment.    ? Keep the bandage dry. Wash around it carefully.    ? If the tape becomes soiled or starts to come off, reinforce it with additional paper tape.    ? Do not smoke for 3 weeks; smoking is detrimental to wound healing.    ? It is normal to have swelling and bruising around the surgical site. The bruising will fade in approximately 10-14 days. Elevate the area to reduce swelling.    ? Numbness, itchiness and sensitivity to temperature changes can occur after surgery and may take up to 18 months to normalize.      POSSIBLE COMPLICATIONS    BLEEDIN. Leave the bandage in place.  2. Use tightly rolled up gauze or a cloth to apply direct pressure over the bandage for 20   minutes.  3. Reapply pressure for an additional 20 minutes if necessary  4. Call the office or go to the nearest emergency room if pressure fails to stop the bleeding.  5. Use additional gauze and tape to maintain pressure once the bleeding has stopped.        PAIN:    1. Post operative pain should slowly get better, never worse.  2. A severe increase in pain may indicate a problem. Call the office if this occurs.    In case of emergency phone:Dr Beltran [phone]

## 2019-04-24 NOTE — NURSING NOTE
Surgical Office Location :   [facility]  [address] [place] [address]

## 2019-04-24 NOTE — NURSING NOTE
"Initial /72   Pulse 72   SpO2 99%  Estimated body mass index is 22.83 kg/m  as calculated from the following:    Height as of 8/30/18: 1.886 m (6' 2.25\").    Weight as of 8/30/18: 81.2 kg (179 lb). .      "

## 2019-04-24 NOTE — LETTER
[facility]  [address] [facility] [address]  Phone: [phone]    May 9, 2019    Noam Del Real                                                                                                                     [facility] [place] [id number]            Dear Mr. Del Real,    Your melanoma test showed that your melanoma was a low risk Class 1 score in which    the 5-year metastatic free survival rate was determined to be 97%.    Thank you,      Mayito Beltran MD/ [facility]

## 2019-05-01 ENCOUNTER — TELEPHONE (OUTPATIENT)
Dept: DERMATOLOGY | Facility: CLINIC | Age: 59
End: 2019-05-01

## 2019-05-01 ENCOUNTER — ALLIED HEALTH/NURSE VISIT (OUTPATIENT)
Dept: DERMATOLOGY | Facility: CLINIC | Age: 59
End: 2019-05-01
Payer: COMMERCIAL

## 2019-05-01 DIAGNOSIS — Z48.01 ENCOUNTER FOR CHANGE OR REMOVAL OF SURGICAL WOUND DRESSING: Primary | ICD-10-CM

## 2019-05-01 PROCEDURE — 99207 ZZC NO CHARGE NURSE ONLY: CPT

## 2019-05-01 NOTE — TELEPHONE ENCOUNTER
----- Message from Mayito Beltran MD sent at 5/1/2019  2:53 PM CDT -----  Your melanoma test showed that your melanoma was a low risk Class 1 score in which the 5-year metastatic free survival rate was determined to be 97%.

## 2019-05-01 NOTE — PROGRESS NOTES
Pt returned to clinic for post surgery 1 week follow up bandage change. Pt has no complaints, denies pain. Bandage removed from left back, area cleansed with normal saline. Site is healing and wound edges approximating well. Reapplied new steri strips and paper tape.    Advised to watch for signs/sx of infection; spreading redness, drainage, odor, fever. Call or report promptly to clinic. Pt given written instructions and informed to rtc as needed. Patient verbalized understanding.     Jillian Cameron MA

## 2019-05-01 NOTE — PATIENT INSTRUCTIONS
WOUND CARE INSTRUCTIONS  for  ONE WEEK AFTER SURGERY                    Left back      1) Leave flat bandage on your skin for one week after today s bandage change.  2) In one week when you remove the bandage, you may resume your regular skin care routine, including washing with mild soap and water, applying moisturizer, make-up and sunscreen.    3) If there are any open or bleeding areas at the incision/graft site you should begin to cover the area with a bandage daily as follows:    1) Clean and dry the area with plain tap water using a Q-tip or sterile gauze pad.  2) Apply Polysporin or Bacitracin ointment to the open area.  3) Cover the wound with a band-aid or a sterile non-stick gauze pad and micropore paper tape.         SIGNS OF INFECTION  - If you notice any of these signs of infection, call your doctor right away: expanding redness around the wound.  - Yellow or greenish-colored pus or cloudy wound drainage.    - Red streaking spreading from the wound.  - Increased swelling, tenderness, or pain around the wound.   - Fever.    Please remember that yellow and clear drainage from a wound can be normal and related to normal wound healing.  Isolated drainage from a wound without a combination of the above features does not indicate infection.       *Once the bandages are removed, the scar will be red and firm (especially in the lip/chin area). This is normal and will fade in time. It might take 6-12 months for this to happen.     *Massaging the area will help the scar soften and fade quicker. Begin to massage the area one month after the bandages have been removed. To massage apply pressure directly and firmly over the scar with the fingertips and move in a circular motion. Massage the area for a few minutes several times a day. Continue to massage the site for several months.    *Approximately 6-8 weeks after surgery it is not uncommon to see the formation of  tender pimple-like  bump along the scar. This is  normal. As the scar continues to mature and the stitches underneath the skin begin to dissolve, this might occur. Do not pick or squeeze, this will resolve on it s own. Should one break open producing a small amount of drainage, apply Polysporin or Bacitracin ointment a few times a day until the wound is completely healed.    *Numbness in the surgical area is expected. It might take 12-18 months for the feeling to return to normal. During this time sensations of itchiness, tingling and occasional sharp pains might be noted. These feelings are normal and will subside once the nerves have completely healed.         IN CASE OF EMERGENCY: Dr Beltran [phone]     If you were seen in [place] call: [phone]    If you were seen in [place] call: [phone]

## 2019-05-02 LAB — LAB SCANNED RESULT: NORMAL

## 2019-08-14 ENCOUNTER — OFFICE VISIT (OUTPATIENT)
Dept: DERMATOLOGY | Facility: CLINIC | Age: 59
End: 2019-08-14
Payer: COMMERCIAL

## 2019-08-14 VITALS — OXYGEN SATURATION: 98 % | HEART RATE: 80 BPM | SYSTOLIC BLOOD PRESSURE: 114 MMHG | DIASTOLIC BLOOD PRESSURE: 79 MMHG

## 2019-08-14 DIAGNOSIS — Z85.820 HISTORY OF MELANOMA: ICD-10-CM

## 2019-08-14 DIAGNOSIS — L82.1 SEBORRHEIC KERATOSIS: ICD-10-CM

## 2019-08-14 DIAGNOSIS — L81.4 LENTIGO: Primary | ICD-10-CM

## 2019-08-14 DIAGNOSIS — D18.01 ANGIOMA OF SKIN: ICD-10-CM

## 2019-08-14 DIAGNOSIS — D23.9 DERMATOFIBROMA: ICD-10-CM

## 2019-08-14 PROCEDURE — 99213 OFFICE O/P EST LOW 20 MIN: CPT | Performed by: DERMATOLOGY

## 2019-08-14 NOTE — LETTER
8/14/2019         RE: Noam Del Real  [address] [facility] [address]        Dear Colleague,    Thank you for referring your patient, Noam Del Real, to the [facility]. Please see a copy of my visit note below.    Noam Del Real is a 58 year old year old male patient here today for hx of 0.5mm melanoma on back . He denie sany new or changing skin lesoins.  Patient reports the following symptoms:  none.  Patient reports the following previous treatments none.  Patient reports the following modifying factors none.  Associated symptoms: none.  Patient has no other skin complaints today.  Remainder of the HPI, Meds, PMH, Allergies, FH, and SH was reviewed in chart.      Past Medical History:   Diagnosis Date     Malignant melanoma (H)        Past Surgical History:   Procedure Laterality Date     C ANESTH,LOWER ARM SURGERY  2002    Left     DENTAL SURGERY      [place]         Family History   Problem Relation Age of Onset     Prostate Cancer Father         Removal of prostate for elevated PSA. Unsure of cancer.        Social History     Socioeconomic History     Marital status:      Spouse name: Belkis Ledesma     Number of children: 0     Years of education: 12+     Highest education level: Not on file   Occupational History     Employer: OTHER   Social Needs     Financial resource strain: Not on file     Food insecurity:     Worry: Not on file     Inability: Not on file     Transportation needs:     Medical: Not on file     Non-medical: Not on file   Tobacco Use     Smoking status: Never Smoker     Smokeless tobacco: Never Used   Substance and Sexual Activity     Alcohol use: Yes     Comment: Rarely     Drug use: No     Sexual activity: Yes     Partners: Female   Lifestyle     Physical activity:     Days per week: Not on file     Minutes per session: Not on file     Stress: Not on file   Relationships     Social connections:     Talks on phone: Not on file     Gets together: Not on file      Attends Taoism service: Not on file     Active member of club or organization: Not on file     Attends meetings of clubs or organizations: Not on file     Relationship status: Not on file     Intimate partner violence:     Fear of current or ex partner: Not on file     Emotionally abused: Not on file     Physically abused: Not on file     Forced sexual activity: Not on file   Other Topics Concern     Parent/sibling w/ CABG, MI or angioplasty before 65F 55M? No   Social History Narrative     Not on file       Outpatient Encounter Medications as of 8/14/2019   Medication Sig Dispense Refill     NO ACTIVE MEDICATIONS        No facility-administered encounter medications on file as of 8/14/2019.              Review Of Systems  Skin: As above  Eyes: negative  Ears/Nose/Throat: negative  Respiratory: No shortness of breath, dyspnea on exertion, cough, or hemoptysis  Cardiovascular: negative  Gastrointestinal: negative  Genitourinary: negative  Musculoskeletal: negative  Neurologic: negative  Psychiatric: negative  Hematologic/Lymphatic/Immunologic: negative  Endocrine: negative      O:   NAD, WDWN, Alert & Oriented, Mood & Affect wnl, Vitals stable   Here today alone   /79   Pulse 80   SpO2 98%    General appearance normal   Vitals stable   Alert, oriented and in no acute distress      Following lymph nodes palpated: Occipital, Cervical, Supraclavicular , axilla, ingual no lad       Stuck on papules and brown macules on trunk and ext   Red papules on trunk  Flesh colored papules on trunk   L leg firm papule        The remainder of the full exam was unremarkable; the following areas were examined:  conjunctiva/lids, oral mucosa, neck, peripheral vascular system, abdomen, lymph nodes, digits/nails, eccrine and apocrine glands, scalp/hair, face, neck, chest, abdomen, buttocks, back, RUE, LUE, RLE, LLE       Eyes: Conjunctivae/lids:Normal     ENT: Lips, buccal mucosa, tongue:  normal    MSK:Normal    Cardiovascular: peripheral edema none    Pulm: Breathing Normal    Lymph Nodes: No Head and Neck Lymphadenopathy     Neuro/Psych: Orientation:Normal; Mood/Affect:Normal      A/P:  1. Seborrheic keratosis, lentigo, angioma, dermal nevus, hx of melanoma , dermatofibroma   MELANOMA DISCUSSED WITH PATIENT:  I discussed the specifics of tumor, prognosis, metachronous melanoma, self exam, and genetics with the patient. I explained the need for monthly skin exams including and taught the patient how to do this. Patient was asked about new or changing moles . I discussed with patient signs and symptoms that could arise in the setting of recurrent locoregional or metastatic disease. In addition, the need to undergo every 4 month dermatologic full skin survey and evaluation given that patients with a diagnosis of melanoma are at risk of recurrence (local and distant) and of subsequent de pablo melanoma.      BENIGN LESIONS DISCUSSED WITH PATIENT:  I discussed the specifics of tumor, prognosis, and genetics of benign lesions.  I explained that treatment of these lesions would be purely cosmetic and not medically neccessary.  I discussed with patient different removal options including excision, cautery and /or laser.      Nature and genetics of benign skin lesions dicussed with patient.  Signs and Symptoms of skin cancer discussed with patient.  ABCDEs of melanoma reviewed with patient.  Patient encouraged to perform monthly skin exams.  UV precautions reviewed with patient.  Skin care regimen reviewed with patient: Eliminate harsh soaps, i.e. Dial, zest, irsih spring; Mild soaps such as Cetaphil or Dove sensitive skin, avoid hot or cold showers, aggressive use of emollients including vanicream, cetaphil or cerave discussed with patient.    Risks of non-melanoma skin cancer discussed with patient   Return to clinic 4 jordonrhs      Again, thank you for allowing me to participate in the care of your  patient.        Sincerely,        Mayito Beltran MD

## 2019-08-14 NOTE — PROGRESS NOTES
Noam Del Real is a 58 year old year old male patient here today for hx of 0.5mm melanoma on back . He denie sany new or changing skin lesoins.  Patient reports the following symptoms:  none.  Patient reports the following previous treatments none.  Patient reports the following modifying factors none.  Associated symptoms: none.  Patient has no other skin complaints today.  Remainder of the HPI, Meds, PMH, Allergies, FH, and SH was reviewed in chart.      Past Medical History:   Diagnosis Date     Malignant melanoma (H)        Past Surgical History:   Procedure Laterality Date     C ANESTH,LOWER ARM SURGERY  2002    Left     DENTAL SURGERY      [place]         Family History   Problem Relation Age of Onset     Prostate Cancer Father         Removal of prostate for elevated PSA. Unsure of cancer.        Social History     Socioeconomic History     Marital status:      Spouse name: Belkis Ledesma     Number of children: 0     Years of education: 12+     Highest education level: Not on file   Occupational History     Employer: OTHER   Social Needs     Financial resource strain: Not on file     Food insecurity:     Worry: Not on file     Inability: Not on file     Transportation needs:     Medical: Not on file     Non-medical: Not on file   Tobacco Use     Smoking status: Never Smoker     Smokeless tobacco: Never Used   Substance and Sexual Activity     Alcohol use: Yes     Comment: Rarely     Drug use: No     Sexual activity: Yes     Partners: Female   Lifestyle     Physical activity:     Days per week: Not on file     Minutes per session: Not on file     Stress: Not on file   Relationships     Social connections:     Talks on phone: Not on file     Gets together: Not on file     Attends Oriental orthodox service: Not on file     Active member of club or organization: Not on file     Attends meetings of clubs or organizations: Not on file     Relationship status: Not on file     Intimate partner violence:     Fear  of current or ex partner: Not on file     Emotionally abused: Not on file     Physically abused: Not on file     Forced sexual activity: Not on file   Other Topics Concern     Parent/sibling w/ CABG, MI or angioplasty before 65F 55M? No   Social History Narrative     Not on file       Outpatient Encounter Medications as of 8/14/2019   Medication Sig Dispense Refill     NO ACTIVE MEDICATIONS        No facility-administered encounter medications on file as of 8/14/2019.              Review Of Systems  Skin: As above  Eyes: negative  Ears/Nose/Throat: negative  Respiratory: No shortness of breath, dyspnea on exertion, cough, or hemoptysis  Cardiovascular: negative  Gastrointestinal: negative  Genitourinary: negative  Musculoskeletal: negative  Neurologic: negative  Psychiatric: negative  Hematologic/Lymphatic/Immunologic: negative  Endocrine: negative      O:   NAD, WDWN, Alert & Oriented, Mood & Affect wnl, Vitals stable   Here today alone   /79   Pulse 80   SpO2 98%    General appearance normal   Vitals stable   Alert, oriented and in no acute distress      Following lymph nodes palpated: Occipital, Cervical, Supraclavicular , axilla, ingual no lad       Stuck on papules and brown macules on trunk and ext   Red papules on trunk  Flesh colored papules on trunk   L leg firm papule        The remainder of the full exam was unremarkable; the following areas were examined:  conjunctiva/lids, oral mucosa, neck, peripheral vascular system, abdomen, lymph nodes, digits/nails, eccrine and apocrine glands, scalp/hair, face, neck, chest, abdomen, buttocks, back, RUE, LUE, RLE, LLE       Eyes: Conjunctivae/lids:Normal     ENT: Lips, buccal mucosa, tongue: normal    MSK:Normal    Cardiovascular: peripheral edema none    Pulm: Breathing Normal    Lymph Nodes: No Head and Neck Lymphadenopathy     Neuro/Psych: Orientation:Normal; Mood/Affect:Normal      A/P:  1. Seborrheic keratosis, lentigo, angioma, dermal nevus, hx of  melanoma , dermatofibroma   MELANOMA DISCUSSED WITH PATIENT:  I discussed the specifics of tumor, prognosis, metachronous melanoma, self exam, and genetics with the patient. I explained the need for monthly skin exams including and taught the patient how to do this. Patient was asked about new or changing moles . I discussed with patient signs and symptoms that could arise in the setting of recurrent locoregional or metastatic disease. In addition, the need to undergo every 4 month dermatologic full skin survey and evaluation given that patients with a diagnosis of melanoma are at risk of recurrence (local and distant) and of subsequent de pablo melanoma.      BENIGN LESIONS DISCUSSED WITH PATIENT:  I discussed the specifics of tumor, prognosis, and genetics of benign lesions.  I explained that treatment of these lesions would be purely cosmetic and not medically neccessary.  I discussed with patient different removal options including excision, cautery and /or laser.      Nature and genetics of benign skin lesions dicussed with patient.  Signs and Symptoms of skin cancer discussed with patient.  ABCDEs of melanoma reviewed with patient.  Patient encouraged to perform monthly skin exams.  UV precautions reviewed with patient.  Skin care regimen reviewed with patient: Eliminate harsh soaps, i.e. Dial, zest, irsih spring; Mild soaps such as Cetaphil or Dove sensitive skin, avoid hot or cold showers, aggressive use of emollients including vanicream, cetaphil or cerave discussed with patient.    Risks of non-melanoma skin cancer discussed with patient   Return to clinic [address] [place] [address]

## 2019-09-24 NOTE — LETTER
[facility]  [address] [facility] [address]  Phone: [phone]      05/01/19    Noam Del Real  [facility] [place] [id number]      To Noam Del Real,  Your melanoma test showed that your melanoma was a low risk Class 1 score in which the 5-year metastatic free survival rate was determined to be 97%.    Sincerely,      Mayito Beltran MD             Abnormal VS & WBC

## 2019-12-16 ENCOUNTER — OFFICE VISIT (OUTPATIENT)
Dept: DERMATOLOGY | Facility: CLINIC | Age: 59
End: 2019-12-16
Payer: COMMERCIAL

## 2019-12-16 VITALS — OXYGEN SATURATION: 98 % | HEART RATE: 69 BPM | SYSTOLIC BLOOD PRESSURE: 146 MMHG | DIASTOLIC BLOOD PRESSURE: 78 MMHG

## 2019-12-16 DIAGNOSIS — L81.4 LENTIGO: ICD-10-CM

## 2019-12-16 DIAGNOSIS — L82.1 SEBORRHEIC KERATOSIS: ICD-10-CM

## 2019-12-16 DIAGNOSIS — D18.01 ANGIOMA OF SKIN: ICD-10-CM

## 2019-12-16 DIAGNOSIS — D23.9 DERMATOFIBROMA: ICD-10-CM

## 2019-12-16 DIAGNOSIS — D23.9 DERMAL NEVUS: ICD-10-CM

## 2019-12-16 DIAGNOSIS — Z85.820 HISTORY OF MELANOMA: Primary | ICD-10-CM

## 2019-12-16 PROCEDURE — 99214 OFFICE O/P EST MOD 30 MIN: CPT | Performed by: DERMATOLOGY

## 2019-12-16 NOTE — LETTER
12/16/2019         RE: Noam Del Real  [address] [facility] [address]        Dear Colleague,    Thank you for referring your patient, Noam Del Real, to the [facility]. Please see a copy of my visit note below.    Noam Del Real is a 59 year old year old male patient here today for hx of melanoma 0.5mm on back.  .  He denies new or changing skin lesions.  .  Patient reports the following symptoms:  none.  Patient reports the following previous treatments none.  These treatments did not work.  Patient reports the following modifying factors none.  Associated symptoms: none.  Patient has no other skin complaints today.  Remainder of the HPI, Meds, PMH, Allergies, FH, and SH was reviewed in chart.      Past Medical History:   Diagnosis Date     Malignant melanoma (H)        Past Surgical History:   Procedure Laterality Date     C ANESTH,LOWER ARM SURGERY  2002    Left     DENTAL SURGERY      [place]         Family History   Problem Relation Age of Onset     Prostate Cancer Father         Removal of prostate for elevated PSA. Unsure of cancer.        Social History     Socioeconomic History     Marital status:      Spouse name: Belkis Ledesma     Number of children: 0     Years of education: 12+     Highest education level: Not on file   Occupational History     Employer: OTHER   Social Needs     Financial resource strain: Not on file     Food insecurity:     Worry: Not on file     Inability: Not on file     Transportation needs:     Medical: Not on file     Non-medical: Not on file   Tobacco Use     Smoking status: Never Smoker     Smokeless tobacco: Never Used   Substance and Sexual Activity     Alcohol use: Yes     Comment: Rarely     Drug use: No     Sexual activity: Yes     Partners: Female   Lifestyle     Physical activity:     Days per week: Not on file     Minutes per session: Not on file     Stress: Not on file   Relationships     Social connections:     Talks on phone: Not on  file     Gets together: Not on file     Attends [employer] service: Not on file     Active member of club or organization: Not on file     Attends meetings of clubs or organizations: Not on file     Relationship status: Not on file     Intimate partner violence:     Fear of current or ex partner: Not on file     Emotionally abused: Not on file     Physically abused: Not on file     Forced sexual activity: Not on file   Other Topics Concern     Parent/sibling w/ CABG, MI or angioplasty before 65F 55M? No   Social History Narrative     Not on file       Outpatient Encounter Medications as of 12/16/2019   Medication Sig Dispense Refill     NO ACTIVE MEDICATIONS        No facility-administered encounter medications on file as of 12/16/2019.              Review Of Systems  Skin: As above  Eyes: negative  Ears/Nose/Throat: negative  Respiratory: No shortness of breath, dyspnea on exertion, cough, or hemoptysis  Cardiovascular: negative  Gastrointestinal: negative  Genitourinary: negative  Musculoskeletal: negative  Neurologic: negative  Psychiatric: negative  Hematologic/Lymphatic/Immunologic: negative  Endocrine: negative      O:   NAD, WDWN, Alert & Oriented, Mood & Affect wnl, Vitals stable   Here today alone   BP (!) 146/78   Pulse 69   SpO2 98%    General appearance normal   Vitals stable   Alert, oriented and in no acute distress      Following lymph nodes palpated: Occipital, Cervical, Supraclavicular , axilla, inguinal no lad   Rare firm papules on legs      Stuck on papules and brown macules on trunk and ext   Red papules on trunk  Flesh colored papules on trunk     The remainder of the full exam was unremarkable; the following areas were examined:  conjunctiva/lids, oral mucosa, neck, peripheral vascular system, abdomen, lymph nodes, digits/nails, eccrine and apocrine glands, scalp/hair, face, neck, chest, abdomen, buttocks, back, RUE, LUE, RLE, LLE       Eyes: Conjunctivae/lids:Normal     ENT: Lips, buccal  mucosa, tongue: normal    MSK:Normal    Cardiovascular: peripheral edema none    Pulm: Breathing Normal    Lymph Nodes: No Head and Neck Lymphadenopathy     Neuro/Psych: Orientation:Alert and Orientedx3 ; Mood/Affect:normal       A/P:  1. Seborrheic keratosis, lentigo, angioma, dermal nevus, hx of melanoma, dermatofibroma  MELANOMA DISCUSSED WITH PATIENT:  I discussed the specifics of tumor, prognosis, metachronous melanoma, self exam, and genetics with the patient. I explained the need for monthly skin exams including and taught the patient how to do this. Patient was asked about new or changing moles . I discussed with patient signs and symptoms that could arise in the setting of recurrent locoregional or metastatic disease. In addition, the need to undergo every 4 month dermatologic full skin survey and evaluation given that patients with a diagnosis of melanoma are at risk of recurrence (local and distant) and of subsequent de pablo melanoma.      BENIGN LESIONS DISCUSSED WITH PATIENT:  I discussed the specifics of tumor, prognosis, and genetics of benign lesions.  I explained that treatment of these lesions would be purely cosmetic and not medically neccessary.  I discussed with patient different removal options including excision, cautery and /or laser.      Nature and genetics of benign skin lesions dicussed with patient.  Signs and Symptoms of skin cancer discussed with patient.  ABCDEs of melanoma reviewed with patient.  Patient encouraged to perform monthly skin exams.  UV precautions reviewed with patient.  Patient to follow up with Primary Care provider regarding elevated blood pressure.  Skin care regimen reviewed with patient: Eliminate harsh soaps, i.e. Dial, zest, irsih spring; Mild soaps such as Cetaphil or Dove sensitive skin, avoid hot or cold showers, aggressive use of emollients including vanicream, cetaphil or cerave discussed with patient.    Risks of non-melanoma skin cancer discussed with  patient   Return to clinic 4 months      Again, thank you for allowing me to participate in the care of your patient.        Sincerely,        Mayito Beltran MD

## 2019-12-16 NOTE — PROGRESS NOTES
Noam Del Real is a 59 year old year old male patient here today for hx of melanoma 0.5mm on back.  .  He denies new or changing skin lesions.  .  Patient reports the following symptoms:  none.  Patient reports the following previous treatments none.  These treatments did not work.  Patient reports the following modifying factors none.  Associated symptoms: none.  Patient has no other skin complaints today.  Remainder of the HPI, Meds, PMH, Allergies, FH, and SH was reviewed in chart.      Past Medical History:   Diagnosis Date     Malignant melanoma (H)        Past Surgical History:   Procedure Laterality Date     C ANESTH,LOWER ARM SURGERY  2002    Left     DENTAL SURGERY      [place]         Family History   Problem Relation Age of Onset     Prostate Cancer Father         Removal of prostate for elevated PSA. Unsure of cancer.        Social History     Socioeconomic History     Marital status:      Spouse name: Belkis Ledesma     Number of children: 0     Years of education: 12+     Highest education level: Not on file   Occupational History     Employer: OTHER   Social Needs     Financial resource strain: Not on file     Food insecurity:     Worry: Not on file     Inability: Not on file     Transportation needs:     Medical: Not on file     Non-medical: Not on file   Tobacco Use     Smoking status: Never Smoker     Smokeless tobacco: Never Used   Substance and Sexual Activity     Alcohol use: Yes     Comment: Rarely     Drug use: No     Sexual activity: Yes     Partners: Female   Lifestyle     Physical activity:     Days per week: Not on file     Minutes per session: Not on file     Stress: Not on file   Relationships     Social connections:     Talks on phone: Not on file     Gets together: Not on file     Attends Adventism service: Not on file     Active member of club or organization: Not on file     Attends meetings of clubs or organizations: Not on file     Relationship status: Not on file      Intimate partner violence:     Fear of current or ex partner: Not on file     Emotionally abused: Not on file     Physically abused: Not on file     Forced sexual activity: Not on file   Other Topics Concern     Parent/sibling w/ CABG, MI or angioplasty before 65F 55M? No   Social History Narrative     Not on file       Outpatient Encounter Medications as of 12/16/2019   Medication Sig Dispense Refill     NO ACTIVE MEDICATIONS        No facility-administered encounter medications on file as of 12/16/2019.              Review Of Systems  Skin: As above  Eyes: negative  Ears/Nose/Throat: negative  Respiratory: No shortness of breath, dyspnea on exertion, cough, or hemoptysis  Cardiovascular: negative  Gastrointestinal: negative  Genitourinary: negative  Musculoskeletal: negative  Neurologic: negative  Psychiatric: negative  Hematologic/Lymphatic/Immunologic: negative  Endocrine: negative      O:   NAD, WDWN, Alert & Oriented, Mood & Affect wnl, Vitals stable   Here today alone   BP (!) 146/78   Pulse 69   SpO2 98%    General appearance normal   Vitals stable   Alert, oriented and in no acute distress      Following lymph nodes palpated: Occipital, Cervical, Supraclavicular , axilla, inguinal no lad   Rare firm papules on legs      Stuck on papules and brown macules on trunk and ext   Red papules on trunk  Flesh colored papules on trunk     The remainder of the full exam was unremarkable; the following areas were examined:  conjunctiva/lids, oral mucosa, neck, peripheral vascular system, abdomen, lymph nodes, digits/nails, eccrine and apocrine glands, scalp/hair, face, neck, chest, abdomen, buttocks, back, RUE, LUE, RLE, LLE       Eyes: Conjunctivae/lids:Normal     ENT: Lips, buccal mucosa, tongue: normal    MSK:Normal    Cardiovascular: peripheral edema none    Pulm: Breathing Normal    Lymph Nodes: No Head and Neck Lymphadenopathy     Neuro/Psych: Orientation:Alert and Orientedx3 ; Mood/Affect:normal       A/P:  1.  Seborrheic keratosis, lentigo, angioma, dermal nevus, hx of melanoma, dermatofibroma  MELANOMA DISCUSSED WITH PATIENT:  I discussed the specifics of tumor, prognosis, metachronous melanoma, self exam, and genetics with the patient. I explained the need for monthly skin exams including and taught the patient how to do this. Patient was asked about new or changing moles . I discussed with patient signs and symptoms that could arise in the setting of recurrent locoregional or metastatic disease. In addition, the need to undergo every 4 month dermatologic full skin survey and evaluation given that patients with a diagnosis of melanoma are at risk of recurrence (local and distant) and of subsequent de pablo melanoma.      BENIGN LESIONS DISCUSSED WITH PATIENT:  I discussed the specifics of tumor, prognosis, and genetics of benign lesions.  I explained that treatment of these lesions would be purely cosmetic and not medically neccessary.  I discussed with patient different removal options including excision, cautery and /or laser.      Nature and genetics of benign skin lesions dicussed with patient.  Signs and Symptoms of skin cancer discussed with patient.  ABCDEs of melanoma reviewed with patient.  Patient encouraged to perform monthly skin exams.  UV precautions reviewed with patient.  Patient to follow up with Primary Care provider regarding elevated blood pressure.  Skin care regimen reviewed with patient: Eliminate harsh soaps, i.e. Dial, zest, irsih spring; Mild soaps such as Cetaphil or Dove sensitive skin, avoid hot or cold showers, aggressive use of emollients including vanicream, cetaphil or cerave discussed with patient.    Risks of non-melanoma skin cancer discussed with patient   Return to clinic 4 months

## 2020-04-20 ENCOUNTER — OFFICE VISIT (OUTPATIENT)
Dept: DERMATOLOGY | Facility: CLINIC | Age: 60
End: 2020-04-20
Payer: COMMERCIAL

## 2020-04-20 VITALS — DIASTOLIC BLOOD PRESSURE: 77 MMHG | OXYGEN SATURATION: 99 % | HEART RATE: 73 BPM | SYSTOLIC BLOOD PRESSURE: 157 MMHG

## 2020-04-20 DIAGNOSIS — D23.9 DERMAL NEVUS: ICD-10-CM

## 2020-04-20 DIAGNOSIS — D23.9 DERMATOFIBROMA: ICD-10-CM

## 2020-04-20 DIAGNOSIS — L81.4 LENTIGO: ICD-10-CM

## 2020-04-20 DIAGNOSIS — Z85.820 HISTORY OF MELANOMA: Primary | ICD-10-CM

## 2020-04-20 DIAGNOSIS — L82.1 SEBORRHEIC KERATOSIS: ICD-10-CM

## 2020-04-20 DIAGNOSIS — D18.01 ANGIOMA OF SKIN: ICD-10-CM

## 2020-04-20 PROCEDURE — 99214 OFFICE O/P EST MOD 30 MIN: CPT | Performed by: DERMATOLOGY

## 2020-04-20 NOTE — NURSING NOTE
"Chief Complaint   Patient presents with     Skin Check       Initial BP (!) 157/77 (BP Location: Right arm, Patient Position: Chair, Cuff Size: Adult Regular)   Pulse 73   SpO2 99%  Estimated body mass index is 22.83 kg/m  as calculated from the following:    Height as of 8/30/18: 1.886 m (6' 2.25\").    Weight as of 8/30/18: 81.2 kg (179 lb).  Medications and allergies reviewed.    Carol BYRNE CMA (AAMA)    "

## 2020-04-20 NOTE — PROGRESS NOTES
Noam Del Real is a 59 year old year old male patientpatient here today for hx of melanoma 0.5mm on back.  .  He denies new or changing skin lesions.  .  Patient reports the following symptoms:  none.  Patient reports the following previous treatments none.  These treatments did not work.  Patient reports the following modifying factors none.  Associated symptoms: none.  Patient has no other skin complaints today.  Remainder of the HPI, Meds, PMH, Allergies, FH, and SH was reviewed in chart.    Pertinent Hx:   Melanoma   Past Medical History:   Diagnosis Date     Malignant melanoma (H)        Past Surgical History:   Procedure Laterality Date     C ANESTH,LOWER ARM SURGERY  2002    Left     DENTAL SURGERY      [place]         Family History   Problem Relation Age of Onset     Prostate Cancer Father         Removal of prostate for elevated PSA. Unsure of cancer.        Social History     Socioeconomic History     Marital status:      Spouse name: Belkis Ledesma     Number of children: 0     Years of education: 12+     Highest education level: Not on file   Occupational History     Employer: OTHER   Social Needs     Financial resource strain: Not on file     Food insecurity     Worry: Not on file     Inability: Not on file     Transportation needs     Medical: Not on file     Non-medical: Not on file   Tobacco Use     Smoking status: Never Smoker     Smokeless tobacco: Never Used   Substance and Sexual Activity     Alcohol use: Yes     Comment: Rarely     Drug use: No     Sexual activity: Yes     Partners: Female   Lifestyle     Physical activity     Days per week: Not on file     Minutes per session: Not on file     Stress: Not on file   Relationships     Social connections     Talks on phone: Not on file     Gets together: Not on file     Attends Jew service: Not on file     Active member of club or organization: Not on file     Attends meetings of clubs or organizations: Not on file     Relationship  status: Not on file     Intimate partner violence     Fear of current or ex partner: Not on file     Emotionally abused: Not on file     Physically abused: Not on file     Forced sexual activity: Not on file   Other Topics Concern     Parent/sibling w/ CABG, MI or angioplasty before 65F 55M? No   Social History Narrative     Not on file       Outpatient Encounter Medications as of 4/20/2020   Medication Sig Dispense Refill     NO ACTIVE MEDICATIONS        No facility-administered encounter medications on file as of 4/20/2020.              Review Of Systems  Skin: As above  Eyes: negative  Ears/Nose/Throat: negative  Respiratory: No shortness of breath, dyspnea on exertion, cough, or hemoptysis  Cardiovascular: negative  Gastrointestinal: negative  Genitourinary: negative  Musculoskeletal: negative  Neurologic: negative  Psychiatric: negative  Hematologic/Lymphatic/Immunologic: negative  Endocrine: negative      O:   NAD, WDWN, Alert & Oriented, Mood & Affect wnl, Vitals stable   Here today alone   BP (!) 157/77 (BP Location: Right arm, Patient Position: Chair, Cuff Size: Adult Regular)   Pulse 73   SpO2 99%    General appearance normal   Vitals stable   Alert, oriented and in no acute distress      Following lymph nodes palpated: Occipital, Cervical, Supraclavicular axilla, inguinla no lad     Firm papules on legs   Stuck on papules and brown macules on trunk and ext   Red papules on trunk  Flesh colored papules on trunk     The remainder of the full exam was normal; the following areas were examined:  conjunctiva/lids, oral mucosa, neck, peripheral vascular system, abdomen, lymph nodes, digits/nails, eccrine and apocrine glands, scalp/hair, face, neck, chest, abdomen, buttocks, back, RUE, LUE, RLE, LLE       Eyes: Conjunctivae/lids:Normal     ENT: Lips, buccal mucosa, tongue: normal    MSK:Normal    Cardiovascular: peripheral edema none    Pulm: Breathing Normal    Lymph Nodes: No Head and Neck Lymphadenopathy      Neuro/Psych: Orientation:Alert and Orientedx3 ; Mood/Affect:normal       A/P:  1. Seborrheic keratosis, lentigo, angioma, dermal nevus, dermatofibroma, hx of melanoma  MELANOMA DISCUSSED WITH PATIENT:  I discussed the specifics of tumor, prognosis, metachronous melanoma, self exam, and genetics with the patient. I explained the need for monthly skin exams including and taught the patient how to do this. Patient was asked about new or changing moles . I discussed with patient signs and symptoms that could arise in the setting of recurrent locoregional or metastatic disease. In addition, the need to undergo every 4 month dermatologic full skin survey and evaluation given that patients with a diagnosis of melanoma are at risk of recurrence (local and distant) and of subsequent de pablo melanoma.      BENIGN LESIONS DISCUSSED WITH PATIENT:  I discussed the specifics of tumor, prognosis, and genetics of benign lesions.  I explained that treatment of these lesions would be purely cosmetic and not medically neccessary.  I discussed with patient different removal options including excision, cautery and /or laser.      Nature and genetics of benign skin lesions dicussed with patient.  Signs and Symptoms of skin cancer discussed with patient.  ABCDEs of melanoma reviewed with patient.  Patient encouraged to perform monthly skin exams.  UV precautions reviewed with patient.  Patient to follow up with Primary Care provider regarding elevated blood pressure.  Skin care regimen reviewed with patient: Eliminate harsh soaps, i.e. Dial, zest, irsih spring; Mild soaps such as Cetaphil or Dove sensitive skin, avoid hot or cold showers, aggressive use of emollients including vanicream, cetaphil or cerave discussed with patient.    Risks of non-melanoma skin cancer discussed with patient   Return to clinic 4 months

## 2020-04-20 NOTE — LETTER
4/20/2020         RE: Noam Del Real  [address] [facility] [address]        Dear Colleague,    Thank you for referring your patient, Noam Del Real, to the [facility]. Please see a copy of my visit note below.    Noam Del Real is a 59 year old year old male patientpatient here today for hx of melanoma 0.5mm on back.  .  He denies new or changing skin lesions.  .  Patient reports the following symptoms:  none.  Patient reports the following previous treatments none.  These treatments did not work.  Patient reports the following modifying factors none.  Associated symptoms: none.  Patient has no other skin complaints today.  Remainder of the HPI, Meds, PMH, Allergies, FH, and SH was reviewed in chart.    Pertinent Hx:   Melanoma   Past Medical History:   Diagnosis Date     Malignant melanoma (H)        Past Surgical History:   Procedure Laterality Date     C ANESTH,LOWER ARM SURGERY  2002    Left     DENTAL SURGERY      [place]         Family History   Problem Relation Age of Onset     Prostate Cancer Father         Removal of prostate for elevated PSA. Unsure of cancer.        Social History     Socioeconomic History     Marital status:      Spouse name: Belkis Ledesma     Number of children: 0     Years of education: 12+     Highest education level: Not on file   Occupational History     Employer: OTHER   Social Needs     Financial resource strain: Not on file     Food insecurity     Worry: Not on file     Inability: Not on file     Transportation needs     Medical: Not on file     Non-medical: Not on file   Tobacco Use     Smoking status: Never Smoker     Smokeless tobacco: Never Used   Substance and Sexual Activity     Alcohol use: Yes     Comment: Rarely     Drug use: No     Sexual activity: Yes     Partners: Female   Lifestyle     Physical activity     Days per week: Not on file     Minutes per session: Not on file     Stress: Not on file   Relationships     Social connections      Talks on phone: Not on file     Gets together: Not on file     Attends [employer] service: Not on file     Active member of club or organization: Not on file     Attends meetings of clubs or organizations: Not on file     Relationship status: Not on file     Intimate partner violence     Fear of current or ex partner: Not on file     Emotionally abused: Not on file     Physically abused: Not on file     Forced sexual activity: Not on file   Other Topics Concern     Parent/sibling w/ CABG, MI or angioplasty before 65F 55M? No   Social History Narrative     Not on file       Outpatient Encounter Medications as of 4/20/2020   Medication Sig Dispense Refill     NO ACTIVE MEDICATIONS        No facility-administered encounter medications on file as of 4/20/2020.              Review Of Systems  Skin: As above  Eyes: negative  Ears/Nose/Throat: negative  Respiratory: No shortness of breath, dyspnea on exertion, cough, or hemoptysis  Cardiovascular: negative  Gastrointestinal: negative  Genitourinary: negative  Musculoskeletal: negative  Neurologic: negative  Psychiatric: negative  Hematologic/Lymphatic/Immunologic: negative  Endocrine: negative      O:   NAD, WDWN, Alert & Oriented, Mood & Affect wnl, Vitals stable   Here today alone   BP (!) 157/77 (BP Location: Right arm, Patient Position: Chair, Cuff Size: Adult Regular)   Pulse 73   SpO2 99%    General appearance normal   Vitals stable   Alert, oriented and in no acute distress      Following lymph nodes palpated: Occipital, Cervical, Supraclavicular axilla, inguinla no lad     Firm papules on legs   Stuck on papules and brown macules on trunk and ext   Red papules on trunk  Flesh colored papules on trunk     The remainder of the full exam was normal; the following areas were examined:  conjunctiva/lids, oral mucosa, neck, peripheral vascular system, abdomen, lymph nodes, digits/nails, eccrine and apocrine glands, scalp/hair, face, neck, chest, abdomen, buttocks,  back, RUE, LUE, RLE, LLE       Eyes: Conjunctivae/lids:Normal     ENT: Lips, buccal mucosa, tongue: normal    MSK:Normal    Cardiovascular: peripheral edema none    Pulm: Breathing Normal    Lymph Nodes: No Head and Neck Lymphadenopathy     Neuro/Psych: Orientation:Alert and Orientedx3 ; Mood/Affect:normal       A/P:  1. Seborrheic keratosis, lentigo, angioma, dermal nevus, dermatofibroma, hx of melanoma  MELANOMA DISCUSSED WITH PATIENT:  I discussed the specifics of tumor, prognosis, metachronous melanoma, self exam, and genetics with the patient. I explained the need for monthly skin exams including and taught the patient how to do this. Patient was asked about new or changing moles . I discussed with patient signs and symptoms that could arise in the setting of recurrent locoregional or metastatic disease. In addition, the need to undergo every 4 month dermatologic full skin survey and evaluation given that patients with a diagnosis of melanoma are at risk of recurrence (local and distant) and of subsequent de pablo melanoma.      BENIGN LESIONS DISCUSSED WITH PATIENT:  I discussed the specifics of tumor, prognosis, and genetics of benign lesions.  I explained that treatment of these lesions would be purely cosmetic and not medically neccessary.  I discussed with patient different removal options including excision, cautery and /or laser.      Nature and genetics of benign skin lesions dicussed with patient.  Signs and Symptoms of skin cancer discussed with patient.  ABCDEs of melanoma reviewed with patient.  Patient encouraged to perform monthly skin exams.  UV precautions reviewed with patient.  Patient to follow up with Primary Care provider regarding elevated blood pressure.  Skin care regimen reviewed with patient: Eliminate harsh soaps, i.e. Dial, zest, irsih spring; Mild soaps such as Cetaphil or Dove sensitive skin, avoid hot or cold showers, aggressive use of emollients including vanicream, cetaphil or  cerave discussed with patient.    Risks of non-melanoma skin cancer discussed with patient   Return to clinic 4 months      Again, thank you for allowing me to participate in the care of your patient.        Sincerely,        Mayito Beltran MD

## 2020-04-24 ENCOUNTER — OFFICE VISIT (OUTPATIENT)
Dept: FAMILY MEDICINE | Facility: CLINIC | Age: 60
End: 2020-04-24
Payer: COMMERCIAL

## 2020-04-24 VITALS
HEIGHT: 74 IN | BODY MASS INDEX: 23.31 KG/M2 | DIASTOLIC BLOOD PRESSURE: 84 MMHG | WEIGHT: 181.6 LBS | TEMPERATURE: 99.2 F | OXYGEN SATURATION: 98 % | SYSTOLIC BLOOD PRESSURE: 150 MMHG | HEART RATE: 64 BPM

## 2020-04-24 DIAGNOSIS — R03.0 ELEVATED BLOOD PRESSURE READING: Primary | ICD-10-CM

## 2020-04-24 LAB
ANION GAP SERPL CALCULATED.3IONS-SCNC: 6 MMOL/L (ref 3–14)
BUN SERPL-MCNC: 23 MG/DL (ref 7–30)
CALCIUM SERPL-MCNC: 9.1 MG/DL (ref 8.5–10.1)
CHLORIDE SERPL-SCNC: 107 MMOL/L (ref 94–109)
CO2 SERPL-SCNC: 29 MMOL/L (ref 20–32)
CREAT SERPL-MCNC: 0.81 MG/DL (ref 0.66–1.25)
CREAT UR-MCNC: 59 MG/DL
ERYTHROCYTE [DISTWIDTH] IN BLOOD BY AUTOMATED COUNT: 12.4 % (ref 10–15)
GFR SERPL CREATININE-BSD FRML MDRD: >90 ML/MIN/{1.73_M2}
GLUCOSE SERPL-MCNC: 91 MG/DL (ref 70–99)
HCT VFR BLD AUTO: 44.9 % (ref 40–53)
HGB BLD-MCNC: 15.8 G/DL (ref 13.3–17.7)
MCH RBC QN AUTO: 30.4 PG (ref 26.5–33)
MCHC RBC AUTO-ENTMCNC: 35.2 G/DL (ref 31.5–36.5)
MCV RBC AUTO: 86 FL (ref 78–100)
MICROALBUMIN UR-MCNC: 6 MG/L
MICROALBUMIN/CREAT UR: 9.66 MG/G CR (ref 0–17)
PLATELET # BLD AUTO: 231 10E9/L (ref 150–450)
POTASSIUM SERPL-SCNC: 3.9 MMOL/L (ref 3.4–5.3)
RBC # BLD AUTO: 5.2 10E12/L (ref 4.4–5.9)
SODIUM SERPL-SCNC: 142 MMOL/L (ref 133–144)
WBC # BLD AUTO: 6.6 10E9/L (ref 4–11)

## 2020-04-24 PROCEDURE — 36415 COLL VENOUS BLD VENIPUNCTURE: CPT | Performed by: PHYSICIAN ASSISTANT

## 2020-04-24 PROCEDURE — 85027 COMPLETE CBC AUTOMATED: CPT | Performed by: PHYSICIAN ASSISTANT

## 2020-04-24 PROCEDURE — 82043 UR ALBUMIN QUANTITATIVE: CPT | Performed by: PHYSICIAN ASSISTANT

## 2020-04-24 PROCEDURE — 99213 OFFICE O/P EST LOW 20 MIN: CPT | Performed by: PHYSICIAN ASSISTANT

## 2020-04-24 PROCEDURE — 80048 BASIC METABOLIC PNL TOTAL CA: CPT | Performed by: PHYSICIAN ASSISTANT

## 2020-04-24 ASSESSMENT — ENCOUNTER SYMPTOMS
FEVER: 0
SHORTNESS OF BREATH: 0
NERVOUS/ANXIOUS: 0
COUGH: 0
RHINORRHEA: 0
ABDOMINAL PAIN: 0
DIFFICULTY URINATING: 0
HEADACHES: 1

## 2020-04-24 ASSESSMENT — MIFFLIN-ST. JEOR: SCORE: 1717.45

## 2020-04-24 NOTE — PATIENT INSTRUCTIONS
There are many factors that can cause your high blood pressure including stress and salt intake.  Over the next week I would like you to:    -Keep a log of your blood pressures.  Take your blood pressure twice per day and only take it if you are completely relaxed.  -Decrease the salt intake in your diet.  -Continue daily exercise and added elements of high intensity exercise including walking hills or running.  -Make sure to drink plenty of water.  -Reduce stress by decreasing covid19 information overload.     We will follow-up in 1 week to discuss your log and the results of your blood work and urine test.    Patient Education     Eating Heart-Healthy Food: Using the DASH Plan    Eating for your heart doesn t have to be hard or boring. You just need to know how to make healthier choices. The DASH eating plan has been developed to help you do just that. DASH stands for Dietary Approaches to Stop Hypertension. It is a plan that has been proven to be healthier for your heart and to lower your risk for high blood pressure. It can also help lower your risk for cancer, heart disease, osteoporosis, and diabetes.  Choosing from each food group  Choose foods from each of the food groups below each day. Try to get the recommended number of servings for each food group. The serving numbers are based on a diet of 2,000 calories a day. Talk to your doctor if you re unsure about your calorie needs. Along with getting the correct servings, the DASH plan also recommends a sodium intake less than 2,300 mg per day.        Grains  Servings: 6 to 8 a day  A serving is:    1 slice bread    1 ounce dry cereal    Half a cup cooked rice, pasta or cereal  Best choices: Whole grains and any grains high in fiber. Vegetables  Servings: 4 to 5 a day  A serving is:    1 cup raw leafy vegetable    Half a cup cut-up raw or cooked vegetable    Half a cup vegetable juice  Best choices: Fresh or frozen vegetables prepared without added salt or fat.    Fruits  Servings: 4 to 5 a day  A serving is:    1 medium fruit    One-quarter cup dried fruit    Half a cup fresh, frozen, or canned fruit    Half a cup of 100% fruit juices  Best choices: A variety of fresh fruits of different colors. Whole fruits are a better choice than fruit juices. Low-fat or fat-free dairy  Servings: 2 to 3 a day  A serving is:    1 cup milk    1 cup yogurt    One and a half ounces cheese  Best choices: Skim or 1% milk, low-fat or fat-free yogurt or buttermilk, and low-fat cheeses.         Lean meats, poultry, fish  Servings: 6 or fewer a day  A serving is:    1 ounce cooked meats, poultry, or fish    1 egg  Best choices: Lean poultry and fish. Trim away visible fat. Broil, grill, roast, or boil instead of frying. Remove skin from poultry before eating. Limit how much red meat you eat.  Nuts, seeds, beans  Servings: 4 to 5 a week  A serving is:    One-third cup nuts (one and a half ounces)    2 tablespoons nut butter or seeds    Half a cup cooked dry beans or legumes  Best choices: Dry roasted nuts with no salt added, lentils, kidney beans, garbanzo beans, and whole wright beans.   Fats and oils  Servings: 2 to 3 a day  A serving is:    1 teaspoon vegetable oil    1 teaspoon soft margarine    1 tablespoon mayonnaise    2 tablespoons salad dressing  Best choices: Nut and vegetable oils (nontropical vegetable oils), such as olive and canola oil. Sweets  Servings: 5 a week or fewer  A serving is:    1 tablespoon sugar, maple syrup, or honey    1 tablespoon jam or jelly    1 half-ounce jelly beans (about 15)    1 cup lemonade  Best choices: Dried fruit can be a satisfying sweet. Choose low-fat sweets. And watch your serving sizes!      For more on the DASH eating plan, visit:  www.nhlbi.nih.gov/health/health-topics/topics/dash   Date Last Reviewed: 6/1/2016 2000-2019 [employer]. [address], [place] [address]. All rights reserved. This information is not intended as  a substitute for professional medical care. Always follow your healthcare professional's instructions.           Patient Education     Controlling High Blood Pressure  High blood pressure (hypertension) is often called the silent killer. This is because many people who have it, don t know it. High blood pressure can raise your risk of heart attack, stroke, heart disease, and heart failure. Controlling your blood pressure can decrease your risk of these problems. Know your blood pressure and remember to check it regularly. Doing so can save your life.  Blood pressure measurements are given as 2 numbers. Systolic blood pressure is the upper number. This is the pressure when the heart contracts. Diastolic blood pressure is the lower number. This is the pressure when the heart relaxes between beats.  Blood pressure is categorized as normal, elevated, or stage 1 or stage 2 high blood pressure:    Normal blood pressure is systolic of less than 120 and diastolic of less than 80 (120/80)    Elevated blood pressure is systolic of 120 to 129 and diastolic less than 80    Stage 1 high blood pressure is systolic is 130 to 139 or diastolic between 80 to 89    Stage 2 high blood pressure is when systolic is 140 or higher or the diastolic is 90 or higher  Here are some things you can do to help control your blood pressure.  Choose heart-healthy foods    Select low-salt, low-fat foods. Limit sodium intake to 2,400 mg per day or the amount suggested by your healthcare provider.    Limit canned, dried, cured, packaged, and fast foods. These can contain a lot of salt.    Eat 8 to 10 servings of fruits and vegetables every day.    Choose lean meats, fish, or chicken.    Eat whole-grain pasta, brown rice, and beans.    Eat 2 to 3 servings of low-fat or fat-free dairy products.    Ask your doctor about the DASH eating plan. This plan helps reduce blood pressure.    When you go to a restaurant, ask that your meal be prepared with no added  salt.    Maintain a healthy weight    Ask your healthcare provider how many calories to eat a day. Then stick to that number.    Ask your healthcare provider what weight range is healthiest for you. If you are overweight, a weight loss of only 3% to 5% of your body weight can help lower blood pressure. Generally, a good weight loss goal is to lose 10% of your body weight in a year.    Limit snacks and sweets.    Get regular exercise.    Get up and get active    Choose activities you enjoy. Find ones you can do with friends or family. This includes bicycling, dancing, walking, and jogging.    Park farther away from building entrances.    Use stairs instead of the elevator.    When you can, walk or bike instead of driving.    [place] leaves, garden, or do household repairs.    Be active at a moderate to vigorous level of physical activity for at least 40 minutes for a minimum of 3 to 4 days a week.     Manage stress    Make time to relax and enjoy life. Find time to laugh.    Communicate your concerns with your loved ones and your healthcare provider.    Visit with family and friends, and keep up with hobbies.    Limit alcohol and quit smoking    Men should have no more than 2 drinks per day.    Women should have no more than 1 drink per day.    Talk with your healthcare provider about quitting smoking. Smoking significantly increases your risk for heart disease and stroke. Ask your healthcare provider about community smoking cessation programs and other options.    Medicines  If lifestyle changes aren t enough, your healthcare provider may prescribe high blood pressure medicine. Take all medicines as prescribed. If you have any questions about your medicines, ask your healthcare provider before stopping or changing them.  Date Last Reviewed: 4/27/2016 2000-2019 [employer]. [address] [facility], [place] [address]. All rights reserved. This information is not intended as a substitute for professional  medical care. Always follow your healthcare professional's instructions.

## 2020-04-24 NOTE — PROGRESS NOTES
Subjective     Noam Del Real is a 59 year old male who presents to clinic today for the following health issues:    Patient notes that his blood pressure has been increasing over the last few years. He notes ringing in his ears at baseline due to working in a loud environment. Patient also notes an intermittent headache ongoing for around 1 month that improves with Aspirin. Patient does note increased stress over the last month due to to being laid off as a dent and the stress around COVID19. Patient does state that he eats some salty foods. He walks daily for exercise. Patient does not increased anxiety with everything going on.     HPI   Hypertension Follow-up      Do you check your blood pressure regularly outside of the clinic? No     Are you following a low salt diet? No    Are your blood pressures ever more than 140 on the top number (systolic) OR more   than 90 on the bottom number (diastolic), for example 140/90? Yes      How many servings of fruits and vegetables do you eat daily?  0-1    On average, how many sweetened beverages do you drink each day (Examples: soda, juice, sweet tea, etc.  Do NOT count diet or artificially sweetened beverages)?   3    How many days per week do you exercise enough to make your heart beat faster? 3 or less    How many minutes a day do you exercise enough to make your heart beat faster? 9 or less    How many days per week do you miss taking your medication? NA      Patient Active Problem List   Diagnosis     Marijuana smoker     Past Surgical History:   Procedure Laterality Date     C ANESTH,LOWER ARM SURGERY  2002    Left     DENTAL SURGERY      [place]        Social History     Tobacco Use     Smoking status: Never Smoker     Smokeless tobacco: Never Used   Substance Use Topics     Alcohol use: Yes     Comment: Rarely     Family History   Problem Relation Age of Onset     Prostate Cancer Father         Removal of prostate for elevated PSA. Unsure of cancer.       "    Current Outpatient Medications   Medication Sig Dispense Refill     NO ACTIVE MEDICATIONS        No Known Allergies    Reviewed and updated as needed this visit by Provider         Review of Systems   Constitutional: Negative for fever.   HENT: Negative for congestion, rhinorrhea and sneezing.    Respiratory: Negative for cough and shortness of breath.    Cardiovascular: Negative for chest pain.   Gastrointestinal: Negative for abdominal pain.   Genitourinary: Negative for difficulty urinating.   Skin: Negative for rash.   Neurological: Positive for headaches (intermittent ).   Psychiatric/Behavioral: The patient is not nervous/anxious.             Objective    BP (!) 150/84 (BP Location: Left arm, Cuff Size: Adult Large)   Pulse 64   Temp 99.2  F (37.3  C) (Tympanic)   Ht 1.886 m (6' 2.25\")   Wt 82.4 kg (181 lb 9.6 oz)   SpO2 98%   BMI 23.16 kg/m    Body mass index is 23.16 kg/m .  Physical Exam  Vitals signs and nursing note reviewed.   Constitutional:       General: He is not in acute distress.     Appearance: Normal appearance.   HENT:      Head: Normocephalic and atraumatic.   Eyes:      Extraocular Movements: Extraocular movements intact.      Pupils: Pupils are equal, round, and reactive to light.   Neck:      Musculoskeletal: Normal range of motion.   Cardiovascular:      Rate and Rhythm: Normal rate and regular rhythm.      Heart sounds: Normal heart sounds.   Pulmonary:      Effort: Pulmonary effort is normal.      Breath sounds: Normal breath sounds. No wheezing, rhonchi or rales.   Musculoskeletal: Normal range of motion.   Skin:     General: Skin is warm and dry.   Neurological:      General: No focal deficit present.      Mental Status: He is alert.   Psychiatric:         Mood and Affect: Mood normal.         Behavior: Behavior normal.          Diagnostic Test Results:  Labs reviewed in Epic  CBC, BMP, Urine Albumin         Assessment & Plan     1. Elevated blood pressure reading  Patient's " blood pressure is elevated here in clinic today.  Patient has not been checking blood pressure at home and does note significant anxiety with being laid off of work and COVID19.  Recommended keeping a BP log, decreasing salt intake, increasing exercise, and decreasing stress surrounding COVID19.  We will follow-up with patient in 1 week to review lab work and log.    - CBC with platelets  - Basic metabolic panel  (Ca, Cl, CO2, Creat, Gluc, K, Na, BUN)  - Albumin Random Urine Quantitative with Creat Ratio       See Patient Instructions    Return in about 1 week (around 5/1/2020).    Gianna Alcantar PA-C  [facility]

## 2020-04-24 NOTE — LETTER
April 27, 2020      Noam Del Real  [address] [place] [id number]        Dear ,    We are writing to inform you of your test results. All lab work is reassuring. We will review results together during your appointment 5/1/2020.     Resulted Orders   CBC with platelets   Result Value Ref Range    WBC 6.6 4.0 - 11.0 10e9/L    RBC Count 5.20 4.4 - 5.9 10e12/L    Hemoglobin 15.8 13.3 - 17.7 g/dL    Hematocrit 44.9 40.0 - 53.0 %    MCV 86 78 - 100 fl    MCH 30.4 26.5 - 33.0 pg    MCHC 35.2 31.5 - 36.5 g/dL    RDW 12.4 10.0 - 15.0 %    Platelet Count 231 150 - 450 10e9/L   Basic metabolic panel  (Ca, Cl, CO2, Creat, Gluc, K, Na, BUN)   Result Value Ref Range    Sodium 142 133 - 144 mmol/L    Potassium 3.9 3.4 - 5.3 mmol/L    Chloride 107 94 - 109 mmol/L    Carbon Dioxide 29 20 - 32 mmol/L    Anion Gap 6 3 - 14 mmol/L    Glucose 91 70 - 99 mg/dL    Urea Nitrogen 23 7 - 30 mg/dL    Creatinine 0.81 0.66 - 1.25 mg/dL    GFR Estimate >90 >60 mL/min/[1.73_m2]      Comment:      Non  GFR Calc  Starting 12/18/2018, serum creatinine based estimated GFR (eGFR) will be   calculated using the Chronic Kidney Disease Epidemiology Collaboration   (CKD-EPI) equation.      GFR Estimate If Black >90 >60 mL/min/[1.73_m2]      Comment:       GFR Calc  Starting 12/18/2018, serum creatinine based estimated GFR (eGFR) will be   calculated using the Chronic Kidney Disease Epidemiology Collaboration   (CKD-EPI) equation.      Calcium 9.1 8.5 - 10.1 mg/dL   Albumin Random Urine Quantitative with Creat Ratio   Result Value Ref Range    Creatinine Urine 59 mg/dL    Albumin Urine mg/L 6 mg/L    Albumin Urine mg/g Cr 9.66 0 - 17 mg/g Cr       If you have any questions or concerns, please call the clinic at the number listed above.       Sincerely,        Gianna Alcantar PA-C/ chip

## 2020-05-01 ENCOUNTER — VIRTUAL VISIT (OUTPATIENT)
Dept: FAMILY MEDICINE | Facility: CLINIC | Age: 60
End: 2020-05-01
Payer: COMMERCIAL

## 2020-05-01 DIAGNOSIS — F41.9 ANXIETY: Primary | ICD-10-CM

## 2020-05-01 DIAGNOSIS — R03.0 ELEVATED BLOOD PRESSURE READING: ICD-10-CM

## 2020-05-01 PROCEDURE — 99213 OFFICE O/P EST LOW 20 MIN: CPT | Mod: 95 | Performed by: PHYSICIAN ASSISTANT

## 2020-05-01 ASSESSMENT — ENCOUNTER SYMPTOMS
FATIGUE: 0
ACTIVITY CHANGE: 0
SHORTNESS OF BREATH: 0
ARTHRALGIAS: 0
NERVOUS/ANXIOUS: 1
HEADACHES: 1

## 2020-05-01 NOTE — PATIENT INSTRUCTIONS
Jorge Santacruz,    As discussed, let us continue lifestyle changes to manage your anxiety and keep your blood pressure within a healthy range.  Please continue the wonderful changes you have already made and add on a few new ideas.    -Continue avoiding sugary beverages such as Mountain Dew.  -Continue avoiding caffeine.  -Continue to keep your salt intake below 2000 mg daily.  -Continue daily walks and consider adding in high intensity exercise.  -Try short meditations to help decrease anxiety.  You may use the phone clara. provided or even listening to some soothing music can help.  -Get up at the same time every day and get ready as if you are going to work.  -Create a schedule for yourself so that you are able to accomplish the tasks you have prioritized.  -Schedule time on your calendar for social interactions with the people you care about.  -Involve your wife in the changes you are making.    Please contact us and schedule a virtual appointment with me if your symptoms are not improving or if you have any new questions or concerns.    Have a nice day!  Sincerely,  Gianna Alcantar PA-C       Patient Education     Anxiety Reaction  Anxiety is the feeling we all get when we think something bad might happen. It is a normal response to stress and usually causes only a mild reaction. When anxiety becomes more severe, it can interfere with daily life. In some cases, you may not even be aware of what it is you re anxious about. There may also be a genetic link or it may be a learned behavior in the home.  Both psychological and physical triggers cause stress reaction. It's often a response to fear or emotional stress, real or imagined. This stress may come from home, family, work, or social relationships.  During an anxiety reaction, you may feel:    Helpless    Nervous    Depressed    Irritable  Your body may show signs of anxiety in many ways. You may experience:    Dry mouth    Shakiness    Dizziness    Weakness    Trouble  breathing    Breathing fast (hyperventilating)    Chest pressure    Sweating    Headache    Nausea    Diarrhea    Tiredness    Inability to sleep    Sexual problems  Home care    Try to locate the sources of stress in your life. They may not be obvious. These may include:  ? Daily hassles of life (such as traffic jams, missed appointments, or car troubles)  ? Major life changes, both good (new baby or job promotion) and bad (loss of job or loss of loved one)  ? Overload: feeling that you have too many responsibilities and can't take care of all of them at once  ? Feeling helpless or feeling that your problems are beyond what you re able to solve    Notice how your body reacts to stress. Learn to listen to your body signals. This will help you take action before the stress becomes severe.    When you can, do something about the source of your stress. (Avoid hassles, limit the amount of change that happens in your life at one time and take a break when you feel overloaded).    Unfortunately, many stressful situations can't be avoided. It is necessary to learn how to better manage stress. There are many proven methods that will reduce your anxiety. These include simple things like exercise, good nutrition, and adequate rest. Also, there are certain techniques that are helpful:  ? Relaxation  ? Breathing exercises  ? Visualization  ? Biofeedback  ? Meditation  For more information about this, consult your healthcare provider or go to a local bookstore and review the many books and tapes available on this subject.  Follow-up care  If you feel that your anxiety is not responding to self-help measures, contact your healthcare provider or make an appointment with a counselor. You may need short-term psychological counseling and temporary medicine to help you manage stress.  Call 911  Call 911 if any of these happen:    Trouble breathing    Confusion    Drowsiness or trouble wakening    Fainting or loss of  consciousness    Rapid heart rate    Seizure    New chest pain that becomes more severe, lasts longer, or spreads into your shoulder, arm, neck, jaw, or back  When to seek medical advice  Call your healthcare provider right away if any of these happen:    Your symptoms get worse    Severe headache not relieved by rest and mild pain reliever  Date Last Reviewed: 10/1/2017    0631-5243 [employer]. [address] [place] [address]. All rights reserved. This information is not intended as a substitute for professional medical care. Always follow your healthcare professional's instructions.

## 2020-05-01 NOTE — PROGRESS NOTES
"Noam Del Real is a 59 year old male who is being evaluated via a billable telephone visit.      The patient has been notified of following:     \"This telephone visit will be conducted via a call between you and your physician/provider. We have found that certain health care needs can be provided without the need for a physical exam.  This service lets us provide the care you need with a short phone conversation.  If a prescription is necessary we can send it directly to your pharmacy.  If lab work is needed we can place an order for that and you can then stop by our lab to have the test done at a later time.    Telephone visits are billed at different rates depending on your insurance coverage. During this emergency period, for some insurers they may be billed the same as an in-person visit.  Please reach out to your insurance provider with any questions.    If during the course of the call the physician/provider feels a telephone visit is not appropriate, you will not be charged for this service.\"    Patient has given verbal consent for Telephone visit?  Yes    How would you like to obtain your AVS? MyChart    Subjective     Noam Del Real is a 59 year old male who presents to clinic today for the following health issues:    HPI     - Elevated blood pressure. He is checking blood pressures at home, readings are averagin/70s. He is not taking any current medications for this. He denies any known family history of hypertension. He is eat a low salt/low sugar diet and is walking 1-2 miles per day. He does not use tobacco products and does not drink any caffeine        Patient notes he has been checking his blood pressure and it had been 135-139/70's. He has had increased anxiety over the last few days which increased his blood pressure to 150's/80's. He has been decreasing salt intake, sweetened beverages, and has been walking every day. He cut Mt. Dew completely out of his diet.     Patient notes headaches " with the anxiety which he treats successfully with Ibuprofen. Patient would like to continue lifestyle changes to manage his anxiety.      Patient Active Problem List   Diagnosis     Marijuana smoker     Past Surgical History:   Procedure Laterality Date     C ANESTH,LOWER ARM SURGERY  2002    Left     DENTAL SURGERY      [place]        Social History     Tobacco Use     Smoking status: Never Smoker     Smokeless tobacco: Never Used   Substance Use Topics     Alcohol use: Yes     Comment: Rarely     Family History   Problem Relation Age of Onset     Prostate Cancer Father         Removal of prostate for elevated PSA. Unsure of cancer.          Current Outpatient Medications   Medication Sig Dispense Refill     NO ACTIVE MEDICATIONS        No Known Allergies    Reviewed and updated as needed this visit by Provider  Tobacco  Allergies  Meds  Problems  Med Hx  Surg Hx  Fam Hx         Review of Systems   Constitutional: Negative for activity change and fatigue.   Respiratory: Negative for shortness of breath.    Musculoskeletal: Negative for arthralgias.   Neurological: Positive for headaches (wih Anxiety ).   Psychiatric/Behavioral: The patient is nervous/anxious.              Objective     Diagnostic Test Results:  Labs reviewed in Epic        Assessment/Plan:  1. Elevated blood pressure reading  2. Anxiety  Elevations in blood pressure appear to be related to increases in anxiety.  Patient has made significant progress with lifestyle changes including decreasing salt intake, discontinuing use of caffeine and sugar sweetened beverages, and walking daily.  Recommended continuing these behaviors.  Additionally, recommended using meditation to decrease acute anxiety episodes, getting up at the same time each day and getting ready as if going to work, creating a schedule to accomplish desired tasks, involving wife in anxiety management techniques, and scheduling time for social interactions.  Recommended follow-up  if symptoms are worsening, or not improving.      Return if symptoms worsen or fail to improve.      Phone call duration:  14 minutes    Gianna Alcantar PA-C

## 2020-08-18 ENCOUNTER — OFFICE VISIT (OUTPATIENT)
Dept: DERMATOLOGY | Facility: CLINIC | Age: 60
End: 2020-08-18
Payer: COMMERCIAL

## 2020-08-18 VITALS — DIASTOLIC BLOOD PRESSURE: 71 MMHG | OXYGEN SATURATION: 99 % | HEART RATE: 54 BPM | SYSTOLIC BLOOD PRESSURE: 122 MMHG

## 2020-08-18 DIAGNOSIS — D18.01 ANGIOMA OF SKIN: ICD-10-CM

## 2020-08-18 DIAGNOSIS — Z85.820 HISTORY OF MELANOMA: Primary | ICD-10-CM

## 2020-08-18 DIAGNOSIS — L81.4 LENTIGO: ICD-10-CM

## 2020-08-18 DIAGNOSIS — L82.1 SEBORRHEIC KERATOSIS: ICD-10-CM

## 2020-08-18 DIAGNOSIS — D23.9 DERMATOFIBROMA: ICD-10-CM

## 2020-08-18 PROCEDURE — 99213 OFFICE O/P EST LOW 20 MIN: CPT | Performed by: DERMATOLOGY

## 2020-08-18 NOTE — PROGRESS NOTES
Noam Del Real is a 59 year old year old male patient here today for hx of 0.5mm back. He denies any new or changing skin lesions today.  Patient reports the following symptoms:  none.  Patient reports the following previous treatments none.  These treatments did not work.  Patient reports the following modifying factors none.  Associated symptoms: none.  Patient has no other skin complaints today.  Remainder of the HPI, Meds, PMH, Allergies, FH, and SH was reviewed in chart.      Past Medical History:   Diagnosis Date     Malignant melanoma (H)        Past Surgical History:   Procedure Laterality Date     C ANESTH,LOWER ARM SURGERY  2002    Left     DENTAL SURGERY      [place]         Family History   Problem Relation Age of Onset     Prostate Cancer Father         Removal of prostate for elevated PSA. Unsure of cancer.        Social History     Socioeconomic History     Marital status:      Spouse name: Belkis Ledesma     Number of children: 0     Years of education: 12+     Highest education level: Not on file   Occupational History     Employer: OTHER   Social Needs     Financial resource strain: Not on file     Food insecurity     Worry: Not on file     Inability: Not on file     Transportation needs     Medical: Not on file     Non-medical: Not on file   Tobacco Use     Smoking status: Never Smoker     Smokeless tobacco: Never Used   Substance and Sexual Activity     Alcohol use: Yes     Comment: Rarely     Drug use: No     Sexual activity: Yes     Partners: Female   Lifestyle     Physical activity     Days per week: Not on file     Minutes per session: Not on file     Stress: Not on file   Relationships     Social connections     Talks on phone: Not on file     Gets together: Not on file     Attends Yazidism service: Not on file     Active member of club or organization: Not on file     Attends meetings of clubs or organizations: Not on file     Relationship status: Not on file     Intimate partner  violence     Fear of current or ex partner: Not on file     Emotionally abused: Not on file     Physically abused: Not on file     Forced sexual activity: Not on file   Other Topics Concern     Parent/sibling w/ CABG, MI or angioplasty before 65F 55M? No   Social History Narrative     Not on file       Outpatient Encounter Medications as of 8/18/2020   Medication Sig Dispense Refill     NO ACTIVE MEDICATIONS        No facility-administered encounter medications on file as of 8/18/2020.              Review Of Systems  Skin: As above  Eyes: negative  Ears/Nose/Throat: negative  Respiratory: No shortness of breath, dyspnea on exertion, cough, or hemoptysis  Cardiovascular: negative  Gastrointestinal: negative  Genitourinary: negative  Musculoskeletal: negative  Neurologic: negative  Psychiatric: negative  Hematologic/Lymphatic/Immunologic: negative  Endocrine: negative      O:   NAD, WDWN, Alert & Oriented, Mood & Affect wnl, Vitals stable   Here today alone   /71   Pulse 54   SpO2 99%    General appearance normal   Vitals stable   Alert, oriented and in no acute distress      Following lymph nodes palpated: Occipital, Cervical, Supraclavicular , axilla, inguinal no lad   Firm papules on legs      Stuck on papules and brown macules on trunk and ext   Red papules on trunk  Flesh colored papules on trunk     The remainder of the full exam was normal; the following areas were examined:  conjunctiva/lids, oral mucosa, neck, peripheral vascular system, abdomen, lymph nodes, digits/nails, eccrine and apocrine glands, scalp/hair, face, neck, chest, abdomen, buttocks, back, RUE, LUE, RLE, LLE       Eyes: Conjunctivae/lids:Normal     ENT: Lips, buccal mucosa, tongue: normal    MSK:Normal    Cardiovascular: peripheral edema none    Pulm: Breathing Normal    Lymph Nodes: No Head and Neck Lymphadenopathy     Neuro/Psych: Orientation:Alert and Orientedx3 ; Mood/Affect:normal       A/P:  1. Seborrheic keratosis, lentigo,  angioma, dermal nevus. Dermatofibroma, hx of melanoma  MELANOMA DISCUSSED WITH PATIENT:  I discussed the specifics of tumor, prognosis, metachronous melanoma, self exam, and genetics with the patient. I explained the need for monthly skin exams including and taught the patient how to do this. Patient was asked about new or changing moles . I discussed with patient signs and symptoms that could arise in the setting of recurrent locoregional or metastatic disease. In addition, the need to undergo every 4 month dermatologic full skin survey and evaluation given that patients with a diagnosis of melanoma are at risk of recurrence (local and distant) and of subsequent de pablo melanoma.      BENIGN LESIONS DISCUSSED WITH PATIENT:  I discussed the specifics of tumor, prognosis, and genetics of benign lesions.  I explained that treatment of these lesions would be purely cosmetic and not medically neccessary.  I discussed with patient different removal options including excision, cautery and /or laser.      Nature and genetics of benign skin lesions dicussed with patient.  Signs and Symptoms of skin cancer discussed with patient.  ABCDEs of melanoma reviewed with patient.  Patient encouraged to perform monthly skin exams.  UV precautions reviewed with patient.  Skin care regimen reviewed with patient: Eliminate harsh soaps, i.e. Dial, zest, irsih spring; Mild soaps such as Cetaphil or Dove sensitive skin, avoid hot or cold showers, aggressive use of emollients including vanicream, cetaphil or cerave discussed with patient.    Risks of non-melanoma skin cancer discussed with patient   Return to clinic 4 months

## 2020-08-18 NOTE — LETTER
8/18/2020         RE: Noam Del Real  [address] [facility] [address]        Dear Colleague,    Thank you for referring your patient, Noam Del Real, to the [facility]. Please see a copy of my visit note below.    Noam Del Real is a 59 year old year old male patient here today for hx of 0.5mm back. He denies any new or changing skin lesions today.  Patient reports the following symptoms:  none.  Patient reports the following previous treatments none.  These treatments did not work.  Patient reports the following modifying factors none.  Associated symptoms: none.  Patient has no other skin complaints today.  Remainder of the HPI, Meds, PMH, Allergies, FH, and SH was reviewed in chart.      Past Medical History:   Diagnosis Date     Malignant melanoma (H)        Past Surgical History:   Procedure Laterality Date     C ANESTH,LOWER ARM SURGERY  2002    Left     DENTAL SURGERY      [place]         Family History   Problem Relation Age of Onset     Prostate Cancer Father         Removal of prostate for elevated PSA. Unsure of cancer.        Social History     Socioeconomic History     Marital status:      Spouse name: Belkis Ledesma     Number of children: 0     Years of education: 12+     Highest education level: Not on file   Occupational History     Employer: OTHER   Social Needs     Financial resource strain: Not on file     Food insecurity     Worry: Not on file     Inability: Not on file     Transportation needs     Medical: Not on file     Non-medical: Not on file   Tobacco Use     Smoking status: Never Smoker     Smokeless tobacco: Never Used   Substance and Sexual Activity     Alcohol use: Yes     Comment: Rarely     Drug use: No     Sexual activity: Yes     Partners: Female   Lifestyle     Physical activity     Days per week: Not on file     Minutes per session: Not on file     Stress: Not on file   Relationships     Social connections     Talks on phone: Not on file     Gets  together: Not on file     Attends Congregation service: Not on file     Active member of club or organization: Not on file     Attends meetings of clubs or organizations: Not on file     Relationship status: Not on file     Intimate partner violence     Fear of current or ex partner: Not on file     Emotionally abused: Not on file     Physically abused: Not on file     Forced sexual activity: Not on file   Other Topics Concern     Parent/sibling w/ CABG, MI or angioplasty before 65F 55M? No   Social History Narrative     Not on file       Outpatient Encounter Medications as of 8/18/2020   Medication Sig Dispense Refill     NO ACTIVE MEDICATIONS        No facility-administered encounter medications on file as of 8/18/2020.              Review Of Systems  Skin: As above  Eyes: negative  Ears/Nose/Throat: negative  Respiratory: No shortness of breath, dyspnea on exertion, cough, or hemoptysis  Cardiovascular: negative  Gastrointestinal: negative  Genitourinary: negative  Musculoskeletal: negative  Neurologic: negative  Psychiatric: negative  Hematologic/Lymphatic/Immunologic: negative  Endocrine: negative      O:   NAD, WDWN, Alert & Oriented, Mood & Affect wnl, Vitals stable   Here today alone   /71   Pulse 54   SpO2 99%    General appearance normal   Vitals stable   Alert, oriented and in no acute distress      Following lymph nodes palpated: Occipital, Cervical, Supraclavicular , axilla, inguinal no lad   Firm papules on legs      Stuck on papules and brown macules on trunk and ext   Red papules on trunk  Flesh colored papules on trunk     The remainder of the full exam was normal; the following areas were examined:  conjunctiva/lids, oral mucosa, neck, peripheral vascular system, abdomen, lymph nodes, digits/nails, eccrine and apocrine glands, scalp/hair, face, neck, chest, abdomen, buttocks, back, RUE, LUE, RLE, LLE       Eyes: Conjunctivae/lids:Normal     ENT: Lips, buccal mucosa, tongue:  normal    MSK:Normal    Cardiovascular: peripheral edema none    Pulm: Breathing Normal    Lymph Nodes: No Head and Neck Lymphadenopathy     Neuro/Psych: Orientation:Alert and Orientedx3 ; Mood/Affect:normal       A/P:  1. Seborrheic keratosis, lentigo, angioma, dermal nevus. Dermatofibroma, hx of melanoma  MELANOMA DISCUSSED WITH PATIENT:  I discussed the specifics of tumor, prognosis, metachronous melanoma, self exam, and genetics with the patient. I explained the need for monthly skin exams including and taught the patient how to do this. Patient was asked about new or changing moles . I discussed with patient signs and symptoms that could arise in the setting of recurrent locoregional or metastatic disease. In addition, the need to undergo every 4 month dermatologic full skin survey and evaluation given that patients with a diagnosis of melanoma are at risk of recurrence (local and distant) and of subsequent de pablo melanoma.      BENIGN LESIONS DISCUSSED WITH PATIENT:  I discussed the specifics of tumor, prognosis, and genetics of benign lesions.  I explained that treatment of these lesions would be purely cosmetic and not medically neccessary.  I discussed with patient different removal options including excision, cautery and /or laser.      Nature and genetics of benign skin lesions dicussed with patient.  Signs and Symptoms of skin cancer discussed with patient.  ABCDEs of melanoma reviewed with patient.  Patient encouraged to perform monthly skin exams.  UV precautions reviewed with patient.  Skin care regimen reviewed with patient: Eliminate harsh soaps, i.e. Dial, zest, irsih spring; Mild soaps such as Cetaphil or Dove sensitive skin, avoid hot or cold showers, aggressive use of emollients including vanicream, cetaphil or cerave discussed with patient.    Risks of non-melanoma skin cancer discussed with patient   Return to clinic 4 months      Again, thank you for allowing me to participate in the care of  your patient.        Sincerely,        Mayito Beltran MD

## 2020-12-14 ENCOUNTER — OFFICE VISIT (OUTPATIENT)
Dept: DERMATOLOGY | Facility: CLINIC | Age: 60
End: 2020-12-14
Payer: COMMERCIAL

## 2020-12-14 VITALS — HEART RATE: 58 BPM | SYSTOLIC BLOOD PRESSURE: 147 MMHG | DIASTOLIC BLOOD PRESSURE: 74 MMHG | OXYGEN SATURATION: 99 %

## 2020-12-14 DIAGNOSIS — L82.1 SEBORRHEIC KERATOSIS: ICD-10-CM

## 2020-12-14 DIAGNOSIS — Z85.820 HISTORY OF MELANOMA: Primary | ICD-10-CM

## 2020-12-14 DIAGNOSIS — D23.9 DERMAL NEVUS: ICD-10-CM

## 2020-12-14 DIAGNOSIS — D18.01 ANGIOMA OF SKIN: ICD-10-CM

## 2020-12-14 DIAGNOSIS — L81.4 LENTIGO: ICD-10-CM

## 2020-12-14 PROCEDURE — 99213 OFFICE O/P EST LOW 20 MIN: CPT | Performed by: DERMATOLOGY

## 2020-12-14 NOTE — PROGRESS NOTES
Noam Del Real is an extremely pleasant 60 year old year old male patient here today for h xof melanoma 0.5mm on back.  HE denies any new or changing skin lesions.  Patient reports the following modifying factors none.  Associated symptoms: none.  Patient has no other skin complaints today.  Remainder of the HPI, Meds, PMH, Allergies, FH, and SH was reviewed in chart.      Past Medical History:   Diagnosis Date     Malignant melanoma (H)        Past Surgical History:   Procedure Laterality Date     C ANESTH,LOWER ARM SURGERY  2002    Left     DENTAL SURGERY      [place]         Family History   Problem Relation Age of Onset     Prostate Cancer Father         Removal of prostate for elevated PSA. Unsure of cancer.        Social History     Socioeconomic History     Marital status:      Spouse name: Belkis Ledesma     Number of children: 0     Years of education: 12+     Highest education level: Not on file   Occupational History     Employer: OTHER   Social Needs     Financial resource strain: Not on file     Food insecurity     Worry: Not on file     Inability: Not on file     Transportation needs     Medical: Not on file     Non-medical: Not on file   Tobacco Use     Smoking status: Never Smoker     Smokeless tobacco: Never Used   Substance and Sexual Activity     Alcohol use: Yes     Comment: Rarely     Drug use: No     Sexual activity: Yes     Partners: Female   Lifestyle     Physical activity     Days per week: Not on file     Minutes per session: Not on file     Stress: Not on file   Relationships     Social connections     Talks on phone: Not on file     Gets together: Not on file     Attends Catholic service: Not on file     Active member of club or organization: Not on file     Attends meetings of clubs or organizations: Not on file     Relationship status: Not on file     Intimate partner violence     Fear of current or ex partner: Not on file     Emotionally abused: Not on file     Physically  abused: Not on file     Forced sexual activity: Not on file   Other Topics Concern     Parent/sibling w/ CABG, MI or angioplasty before 65F 55M? No   Social History Narrative     Not on file       Outpatient Encounter Medications as of 12/14/2020   Medication Sig Dispense Refill     NO ACTIVE MEDICATIONS        No facility-administered encounter medications on file as of 12/14/2020.              Review Of Systems  Skin: As above  Eyes: negative  Ears/Nose/Throat: negative  Respiratory: No shortness of breath, dyspnea on exertion, cough, or hemoptysis  Cardiovascular: negative  Gastrointestinal: negative  Genitourinary: negative  Musculoskeletal: negative  Neurologic: negative  Psychiatric: negative  Hematologic/Lymphatic/Immunologic: negative  Endocrine: negative      O:   NAD, WDWN, Alert & Oriented, Mood & Affect wnl, Vitals stable   Here today alone   BP (!) 147/74   Pulse 58   SpO2 99%    General appearance normal   Vitals stable   Alert, oriented and in no acute distress      Following lymph nodes palpated: Occipital, Cervical, Supraclavicular , axillam, inguinal no lad      Stuck on papules and brown macules on trunk and ext   Red papules on trunk  Flesh colored papules on trunk     The remainder of the full exam was normal; the following areas were examined:  conjunctiva/lids, oral mucosa, neck, peripheral vascular system, abdomen, lymph nodes, digits/nails, eccrine and apocrine glands, scalp/hair, face, neck, chest, abdomen, buttocks, back, RUE, LUE, RLE, LLE       Eyes: Conjunctivae/lids:Normal     ENT: Lips, buccal mucosa, tongue: normal    MSK:Normal    Cardiovascular: peripheral edema none    Pulm: Breathing Normal    Lymph Nodes: No Head and Neck Lymphadenopathy     Neuro/Psych: Orientation:Alert and Orientedx3 ; Mood/Affect:normal       A/P:  1. Seborrheic keratosis, lentigo, angioma, dermal nevus, hx of melanoa  MELANOMA DISCUSSED WITH PATIENT:  I discussed the specifics of tumor, prognosis,  metachronous melanoma, self exam, and genetics with the patient. I explained the need for monthly skin exams including and taught the patient how to do this. Patient was asked about new or changing moles . I discussed with patient signs and symptoms that could arise in the setting of recurrent locoregional or metastatic disease. In addition, the need to undergo every 4 month dermatologic full skin survey and evaluation given that patients with a diagnosis of melanoma are at risk of recurrence (local and distant) and of subsequent de pablo melanoma.        It was a pleasure speaking to Noam Del Real today.  BENIGN LESIONS DISCUSSED WITH PATIENT:  I discussed the specifics of tumor, prognosis, and genetics of benign lesions.  I explained that treatment of these lesions would be purely cosmetic and not medically neccessary.  I discussed with patient different removal options including excision, cautery and /or laser.      Nature and genetics of benign skin lesions dicussed with patient.  Signs and Symptoms of skin cancer discussed with patient.  ABCDEs of melanoma reviewed with patient.  Patient encouraged to perform monthly skin exams.  UV precautions reviewed with patient.  Skin care regimen reviewed with patient: Eliminate harsh soaps, i.e. Dial, zest, irsih spring; Mild soaps such as Cetaphil or Dove sensitive skin, avoid hot or cold showers, aggressive use of emollients including vanicream, cetaphil or cerave discussed with patient.    Risks of non-melanoma skin cancer discussed with patient   Return to clinic 4 months

## 2020-12-14 NOTE — LETTER
12/14/2020         RE: Noam Del Real  [address] [facility] [address]        Dear Colleague,    Thank you for referring your patient, Noam Del Real, to the [facility]. Please see a copy of my visit note below.    Noam Del Real is an extremely pleasant 60 year old year old male patient here today for h xof melanoma 0.5mm on back.  HE denies any new or changing skin lesions.  Patient reports the following modifying factors none.  Associated symptoms: none.  Patient has no other skin complaints today.  Remainder of the HPI, Meds, PMH, Allergies, FH, and SH was reviewed in chart.      Past Medical History:   Diagnosis Date     Malignant melanoma (H)        Past Surgical History:   Procedure Laterality Date     C ANESTH,LOWER ARM SURGERY  2002    Left     DENTAL SURGERY      [place]         Family History   Problem Relation Age of Onset     Prostate Cancer Father         Removal of prostate for elevated PSA. Unsure of cancer.        Social History     Socioeconomic History     Marital status:      Spouse name: Belkis Ledesma     Number of children: 0     Years of education: 12+     Highest education level: Not on file   Occupational History     Employer: OTHER   Social Needs     Financial resource strain: Not on file     Food insecurity     Worry: Not on file     Inability: Not on file     Transportation needs     Medical: Not on file     Non-medical: Not on file   Tobacco Use     Smoking status: Never Smoker     Smokeless tobacco: Never Used   Substance and Sexual Activity     Alcohol use: Yes     Comment: Rarely     Drug use: No     Sexual activity: Yes     Partners: Female   Lifestyle     Physical activity     Days per week: Not on file     Minutes per session: Not on file     Stress: Not on file   Relationships     Social connections     Talks on phone: Not on file     Gets together: Not on file     Attends Scientologist service: Not on file     Active member of club or  organization: Not on file     Attends meetings of clubs or organizations: Not on file     Relationship status: Not on file     Intimate partner violence     Fear of current or ex partner: Not on file     Emotionally abused: Not on file     Physically abused: Not on file     Forced sexual activity: Not on file   Other Topics Concern     Parent/sibling w/ CABG, MI or angioplasty before 65F 55M? No   Social History Narrative     Not on file       Outpatient Encounter Medications as of 12/14/2020   Medication Sig Dispense Refill     NO ACTIVE MEDICATIONS        No facility-administered encounter medications on file as of 12/14/2020.              Review Of Systems  Skin: As above  Eyes: negative  Ears/Nose/Throat: negative  Respiratory: No shortness of breath, dyspnea on exertion, cough, or hemoptysis  Cardiovascular: negative  Gastrointestinal: negative  Genitourinary: negative  Musculoskeletal: negative  Neurologic: negative  Psychiatric: negative  Hematologic/Lymphatic/Immunologic: negative  Endocrine: negative      O:   NAD, WDWN, Alert & Oriented, Mood & Affect wnl, Vitals stable   Here today alone   BP (!) 147/74   Pulse 58   SpO2 99%    General appearance normal   Vitals stable   Alert, oriented and in no acute distress      Following lymph nodes palpated: Occipital, Cervical, Supraclavicular , axillam, inguinal no lad      Stuck on papules and brown macules on trunk and ext   Red papules on trunk  Flesh colored papules on trunk     The remainder of the full exam was normal; the following areas were examined:  conjunctiva/lids, oral mucosa, neck, peripheral vascular system, abdomen, lymph nodes, digits/nails, eccrine and apocrine glands, scalp/hair, face, neck, chest, abdomen, buttocks, back, RUE, LUE, RLE, LLE       Eyes: Conjunctivae/lids:Normal     ENT: Lips, buccal mucosa, tongue: normal    MSK:Normal    Cardiovascular: peripheral edema none    Pulm: Breathing Normal    Lymph Nodes: No Head and Neck  Lymphadenopathy     Neuro/Psych: Orientation:Alert and Orientedx3 ; Mood/Affect:normal       A/P:  1. Seborrheic keratosis, lentigo, angioma, dermal nevus, hx of melanoa  MELANOMA DISCUSSED WITH PATIENT:  I discussed the specifics of tumor, prognosis, metachronous melanoma, self exam, and genetics with the patient. I explained the need for monthly skin exams including and taught the patient how to do this. Patient was asked about new or changing moles . I discussed with patient signs and symptoms that could arise in the setting of recurrent locoregional or metastatic disease. In addition, the need to undergo every 4 month dermatologic full skin survey and evaluation given that patients with a diagnosis of melanoma are at risk of recurrence (local and distant) and of subsequent de pablo melanoma.        It was a pleasure speaking to Noam Del Real today.  BENIGN LESIONS DISCUSSED WITH PATIENT:  I discussed the specifics of tumor, prognosis, and genetics of benign lesions.  I explained that treatment of these lesions would be purely cosmetic and not medically neccessary.  I discussed with patient different removal options including excision, cautery and /or laser.      Nature and genetics of benign skin lesions dicussed with patient.  Signs and Symptoms of skin cancer discussed with patient.  ABCDEs of melanoma reviewed with patient.  Patient encouraged to perform monthly skin exams.  UV precautions reviewed with patient.  Skin care regimen reviewed with patient: Eliminate harsh soaps, i.e. Dial, zest, irsih spring; Mild soaps such as Cetaphil or Dove sensitive skin, avoid hot or cold showers, aggressive use of emollients including vanicream, cetaphil or cerave discussed with patient.    Risks of non-melanoma skin cancer discussed with patient   Return to clinic 4 months        Again, thank you for allowing me to participate in the care of your patient.        Sincerely,        Mayito Beltran MD

## 2021-04-13 ENCOUNTER — OFFICE VISIT (OUTPATIENT)
Dept: DERMATOLOGY | Facility: CLINIC | Age: 61
End: 2021-04-13
Payer: COMMERCIAL

## 2021-04-13 VITALS — SYSTOLIC BLOOD PRESSURE: 149 MMHG | OXYGEN SATURATION: 100 % | HEART RATE: 55 BPM | DIASTOLIC BLOOD PRESSURE: 74 MMHG

## 2021-04-13 DIAGNOSIS — L81.4 LENTIGO: ICD-10-CM

## 2021-04-13 DIAGNOSIS — D18.01 ANGIOMA OF SKIN: ICD-10-CM

## 2021-04-13 DIAGNOSIS — L82.1 SEBORRHEIC KERATOSIS: ICD-10-CM

## 2021-04-13 DIAGNOSIS — D23.9 DERMAL NEVUS: ICD-10-CM

## 2021-04-13 DIAGNOSIS — Z85.820 HISTORY OF MELANOMA: Primary | ICD-10-CM

## 2021-04-13 PROCEDURE — 99213 OFFICE O/P EST LOW 20 MIN: CPT | Performed by: DERMATOLOGY

## 2021-04-13 NOTE — NURSING NOTE
Chief Complaint   Patient presents with     Skin Check     no concerns       Vitals:    04/13/21 1432   BP: (!) 149/74   BP Location: Right arm   Patient Position: Sitting   Cuff Size: Adult Regular   Pulse: 55   SpO2: 100%     Wt Readings from Last 1 Encounters:   04/24/20 82.4 kg (181 lb 9.6 oz)       Connie Mckeon LPN .................4/13/2021

## 2021-04-13 NOTE — LETTER
4/13/2021         RE: Noam Del Real  [address] [facility] [address]        Dear Colleague,    Thank you for referring your patient, Noam Del Real, to the [facility]. Please see a copy of my visit note below.    Noam Del Real is an extremely pleasant 60 year old year old male patient here today for hx of 0.5mm melanoma back.  He denies any new or changing skin lesions.  Patient reports the following modifying factors none.  Associated symptoms: none.  Patient has no other skin complaints today.  Remainder of the HPI, Meds, PMH, Allergies, FH, and SH was reviewed in chart.      Past Medical History:   Diagnosis Date     Malignant melanoma (H)        Past Surgical History:   Procedure Laterality Date     C ANESTH,LOWER ARM SURGERY  2002    Left     DENTAL SURGERY      [place]         Family History   Problem Relation Age of Onset     Prostate Cancer Father         Removal of prostate for elevated PSA. Unsure of cancer.        Social History     Socioeconomic History     Marital status:      Spouse name: Belkis Ledesma     Number of children: 0     Years of education: 12+     Highest education level: Not on file   Occupational History     Employer: OTHER   Social Needs     Financial resource strain: Not on file     Food insecurity     Worry: Not on file     Inability: Not on file     Transportation needs     Medical: Not on file     Non-medical: Not on file   Tobacco Use     Smoking status: Never Smoker     Smokeless tobacco: Never Used   Substance and Sexual Activity     Alcohol use: Yes     Comment: Rarely     Drug use: No     Sexual activity: Yes     Partners: Female   Lifestyle     Physical activity     Days per week: Not on file     Minutes per session: Not on file     Stress: Not on file   Relationships     Social connections     Talks on phone: Not on file     Gets together: Not on file     Attends Adventist service: Not on file     Active member of club or  organization: Not on file     Attends meetings of clubs or organizations: Not on file     Relationship status: Not on file     Intimate partner violence     Fear of current or ex partner: Not on file     Emotionally abused: Not on file     Physically abused: Not on file     Forced sexual activity: Not on file   Other Topics Concern     Parent/sibling w/ CABG, MI or angioplasty before 65F 55M? No   Social History Narrative     Not on file       Outpatient Encounter Medications as of 4/13/2021   Medication Sig Dispense Refill     NO ACTIVE MEDICATIONS        No facility-administered encounter medications on file as of 4/13/2021.              Review Of Systems  Skin: As above  Eyes: negative  Ears/Nose/Throat: negative  Respiratory: No shortness of breath, dyspnea on exertion, cough, or hemoptysis  Cardiovascular: negative  Gastrointestinal: negative  Genitourinary: negative  Musculoskeletal: negative  Neurologic: negative  Psychiatric: negative  Hematologic/Lymphatic/Immunologic: negative  Endocrine: negative      O:   NAD, WDWN, Alert & Oriented, Mood & Affect wnl, Vitals stable   Here today alone   BP (!) 149/74 (BP Location: Right arm, Patient Position: Sitting, Cuff Size: Adult Regular)   Pulse 55   SpO2 100%    General appearance normal   Vitals stable   Alert, oriented and in no acute distress      Following lymph nodes palpated: Occipital, Cervical, Supraclavicular , axilla, inguinal no lad       Stuck on papules and brown macules on trunk and ext   Red papules on trunk  Flesh colored papules on trunk     The remainder of the full exam was normal; the following areas were examined:  conjunctiva/lids, oral mucosa, neck, peripheral vascular system, abdomen, lymph nodes, digits/nails, eccrine and apocrine glands, scalp/hair, face, neck, chest, abdomen, buttocks, back, RUE, LUE, RLE, LLE       Eyes: Conjunctivae/lids:Normal     ENT: Lips, buccal mucosa, tongue: normal    MSK:Normal    Cardiovascular: peripheral  edema none    Pulm: Breathing Normal    Lymph Nodes: No Head and Neck Lymphadenopathy     Neuro/Psych: Orientation:Alert and Orientedx3 ; Mood/Affect:normal       A/P:  1. Seborrheic keratosis, lentigo, angioma, dermal nevus, hx of melanoma  MELANOMA DISCUSSED WITH PATIENT:  I discussed the specifics of tumor, prognosis, metachronous melanoma, self exam, and genetics with the patient. I explained the need for monthly skin exams including and taught the patient how to do this. Patient was asked about new or changing moles . I discussed with patient signs and symptoms that could arise in the setting of recurrent locoregional or metastatic disease. In addition, the need to undergo every 4 month dermatologic full skin survey and evaluation given that patients with a diagnosis of melanoma are at risk of recurrence (local and distant) and of subsequent de pablo melanoma.      It was a pleasure speaking to Noam Del Real today.  Previous clinic  notes and pertinent laboratory tests were reviewed prior to Noam Del Real's visit.  Signs and Symptoms of skin cancer discussed with patient.  Patient encouraged to perform monthly skin exams.  UV precautions reviewed with patient.  Risks of non-melanoma skin cancer discussed with patient   Return to clinic 4 months        Again, thank you for allowing me to participate in the care of your patient.        Sincerely,        Mayito Beltran MD

## 2021-04-13 NOTE — PROGRESS NOTES
Noam Del Real is an extremely pleasant 60 year old year old male patient here today for hx of 0.5mm melanoma back.  He denies any new or changing skin lesions.  Patient reports the following modifying factors none.  Associated symptoms: none.  Patient has no other skin complaints today.  Remainder of the HPI, Meds, PMH, Allergies, FH, and SH was reviewed in chart.      Past Medical History:   Diagnosis Date     Malignant melanoma (H)        Past Surgical History:   Procedure Laterality Date     C ANESTH,LOWER ARM SURGERY  2002    Left     DENTAL SURGERY      [place]         Family History   Problem Relation Age of Onset     Prostate Cancer Father         Removal of prostate for elevated PSA. Unsure of cancer.        Social History     Socioeconomic History     Marital status:      Spouse name: Belkis Ledesma     Number of children: 0     Years of education: 12+     Highest education level: Not on file   Occupational History     Employer: OTHER   Social Needs     Financial resource strain: Not on file     Food insecurity     Worry: Not on file     Inability: Not on file     Transportation needs     Medical: Not on file     Non-medical: Not on file   Tobacco Use     Smoking status: Never Smoker     Smokeless tobacco: Never Used   Substance and Sexual Activity     Alcohol use: Yes     Comment: Rarely     Drug use: No     Sexual activity: Yes     Partners: Female   Lifestyle     Physical activity     Days per week: Not on file     Minutes per session: Not on file     Stress: Not on file   Relationships     Social connections     Talks on phone: Not on file     Gets together: Not on file     Attends Roman Catholic service: Not on file     Active member of club or organization: Not on file     Attends meetings of clubs or organizations: Not on file     Relationship status: Not on file     Intimate partner violence     Fear of current or ex partner: Not on file     Emotionally abused: Not on file     Physically  abused: Not on file     Forced sexual activity: Not on file   Other Topics Concern     Parent/sibling w/ CABG, MI or angioplasty before 65F 55M? No   Social History Narrative     Not on file       Outpatient Encounter Medications as of 4/13/2021   Medication Sig Dispense Refill     NO ACTIVE MEDICATIONS        No facility-administered encounter medications on file as of 4/13/2021.              Review Of Systems  Skin: As above  Eyes: negative  Ears/Nose/Throat: negative  Respiratory: No shortness of breath, dyspnea on exertion, cough, or hemoptysis  Cardiovascular: negative  Gastrointestinal: negative  Genitourinary: negative  Musculoskeletal: negative  Neurologic: negative  Psychiatric: negative  Hematologic/Lymphatic/Immunologic: negative  Endocrine: negative      O:   NAD, WDWN, Alert & Oriented, Mood & Affect wnl, Vitals stable   Here today alone   BP (!) 149/74 (BP Location: Right arm, Patient Position: Sitting, Cuff Size: Adult Regular)   Pulse 55   SpO2 100%    General appearance normal   Vitals stable   Alert, oriented and in no acute distress      Following lymph nodes palpated: Occipital, Cervical, Supraclavicular , axilla, inguinal no lad       Stuck on papules and brown macules on trunk and ext   Red papules on trunk  Flesh colored papules on trunk     The remainder of the full exam was normal; the following areas were examined:  conjunctiva/lids, oral mucosa, neck, peripheral vascular system, abdomen, lymph nodes, digits/nails, eccrine and apocrine glands, scalp/hair, face, neck, chest, abdomen, buttocks, back, RUE, LUE, RLE, LLE       Eyes: Conjunctivae/lids:Normal     ENT: Lips, buccal mucosa, tongue: normal    MSK:Normal    Cardiovascular: peripheral edema none    Pulm: Breathing Normal    Lymph Nodes: No Head and Neck Lymphadenopathy     Neuro/Psych: Orientation:Alert and Orientedx3 ; Mood/Affect:normal       A/P:  1. Seborrheic keratosis, lentigo, angioma, dermal nevus, hx of melanoma  MELANOMA  DISCUSSED WITH PATIENT:  I discussed the specifics of tumor, prognosis, metachronous melanoma, self exam, and genetics with the patient. I explained the need for monthly skin exams including and taught the patient how to do this. Patient was asked about new or changing moles . I discussed with patient signs and symptoms that could arise in the setting of recurrent locoregional or metastatic disease. In addition, the need to undergo every 4 month dermatologic full skin survey and evaluation given that patients with a diagnosis of melanoma are at risk of recurrence (local and distant) and of subsequent de pablo melanoma.      It was a pleasure speaking to Noam Del Real today.  Previous clinic  notes and pertinent laboratory tests were reviewed prior to Noam Del Real's visit.  Signs and Symptoms of skin cancer discussed with patient.  Patient encouraged to perform monthly skin exams.  UV precautions reviewed with patient.  Risks of non-melanoma skin cancer discussed with patient   Return to clinic 4 months     Instructions: This plan will send the code FBSD to the PM system.  DO NOT or CHANGE the price. Price (Do Not Change): 0.00 Detail Level: Simple

## 2021-05-14 ENCOUNTER — IMMUNIZATION (OUTPATIENT)
Dept: FAMILY MEDICINE | Facility: CLINIC | Age: 61
End: 2021-05-14
Payer: COMMERCIAL

## 2021-05-14 PROCEDURE — 0011A PR COVID VAC MODERNA 100 MCG/0.5 ML IM: CPT

## 2021-05-14 PROCEDURE — 91301 PR COVID VAC MODERNA 100 MCG/0.5 ML IM: CPT

## 2021-06-11 ENCOUNTER — IMMUNIZATION (OUTPATIENT)
Dept: FAMILY MEDICINE | Facility: CLINIC | Age: 61
End: 2021-06-11
Attending: FAMILY MEDICINE
Payer: COMMERCIAL

## 2021-06-11 PROCEDURE — 91301 PR COVID VAC MODERNA 100 MCG/0.5 ML IM: CPT

## 2021-06-11 PROCEDURE — 0012A PR COVID VAC MODERNA 100 MCG/0.5 ML IM: CPT

## 2021-08-17 ENCOUNTER — OFFICE VISIT (OUTPATIENT)
Dept: DERMATOLOGY | Facility: CLINIC | Age: 61
End: 2021-08-17
Payer: COMMERCIAL

## 2021-08-17 VITALS
HEART RATE: 67 BPM | SYSTOLIC BLOOD PRESSURE: 157 MMHG | DIASTOLIC BLOOD PRESSURE: 78 MMHG | BODY MASS INDEX: 22.11 KG/M2 | HEIGHT: 76 IN

## 2021-08-17 DIAGNOSIS — Z85.820 HISTORY OF MELANOMA: Primary | ICD-10-CM

## 2021-08-17 DIAGNOSIS — D18.01 ANGIOMA OF SKIN: ICD-10-CM

## 2021-08-17 DIAGNOSIS — L81.4 LENTIGO: ICD-10-CM

## 2021-08-17 DIAGNOSIS — L82.1 SEBORRHEIC KERATOSIS: ICD-10-CM

## 2021-08-17 DIAGNOSIS — D23.9 DERMAL NEVUS: ICD-10-CM

## 2021-08-17 PROCEDURE — 99213 OFFICE O/P EST LOW 20 MIN: CPT | Performed by: DERMATOLOGY

## 2021-08-17 NOTE — PROGRESS NOTES
Noam Del Real is an extremely pleasant 60 year old year old male patient here today for hx of 0.5mm melanoma on back.  HE denies any new or changing skin lesions.  He cut arm cut at work with cabinent but otherwise doing well.  Patient has no other skin complaints today.  Remainder of the HPI, Meds, PMH, Allergies, FH, and SH was reviewed in chart.      Past Medical History:   Diagnosis Date     Malignant melanoma (H)        Past Surgical History:   Procedure Laterality Date     C ANESTH,LOWER ARM SURGERY  2002    Left     DENTAL SURGERY      [place]         Family History   Problem Relation Age of Onset     Prostate Cancer Father         Removal of prostate for elevated PSA. Unsure of cancer.        Social History     Socioeconomic History     Marital status:      Spouse name: Belkis Ledesma     Number of children: 0     Years of education: 12+     Highest education level: Not on file   Occupational History     Employer: OTHER   Tobacco Use     Smoking status: Never Smoker     Smokeless tobacco: Never Used   Substance and Sexual Activity     Alcohol use: Yes     Comment: Rarely     Drug use: No     Sexual activity: Yes     Partners: Female   Other Topics Concern     Parent/sibling w/ CABG, MI or angioplasty before 65F 55M? No   Social History Narrative     Not on file     Social Determinants of Health     Financial Resource Strain:      Difficulty of Paying Living Expenses:    Food Insecurity:      Worried About Running Out of Food in the Last Year:      Ran Out of Food in the Last Year:    Transportation Needs:      Lack of Transportation (Medical):      Lack of Transportation (Non-Medical):    Physical Activity:      Days of Exercise per Week:      Minutes of Exercise per Session:    Stress:      Feeling of Stress :    Social Connections:      Frequency of Communication with Friends and Family:      Frequency of Social Gatherings with Friends and Family:      Attends Confucianist Services:      Active  "Member of Clubs or Organizations:      Attends Club or Organization Meetings:      Marital Status:    Intimate Partner Violence:      Fear of Current or Ex-Partner:      Emotionally Abused:      Physically Abused:      Sexually Abused:        Outpatient Encounter Medications as of 8/17/2021   Medication Sig Dispense Refill     NO ACTIVE MEDICATIONS        No facility-administered encounter medications on file as of 8/17/2021.             O:   NAD, WDWN, Alert & Oriented, Mood & Affect wnl, Vitals stable   Here today alone   BP (!) 157/78   Pulse 67   Ht 1.93 m (6' 4\")   BMI 22.11 kg/m     General appearance normal   Vitals stable   Alert, oriented and in no acute distress      Following lymph nodes palpated: Occipital, Cervical, Supraclavicular , axilla, inguinal no lad      Stuck on papules and brown macules on trunk and ext   Red papules on trunk  Flesh colored papules on trunk   Abrasion right arm   The remainder of the full exam was normal; the following areas were examined:  conjunctiva/lids, oral mucosa, neck, peripheral vascular system, abdomen, lymph nodes, digits/nails, eccrine and apocrine glands, scalp/hair, face, neck, chest, abdomen, buttocks, back, RUE, LUE, RLE, LLE       Eyes: Conjunctivae/lids:Normal     ENT: Lips, buccal mucosa, tongue: normal    MSK:Normal    Cardiovascular: peripheral edema none    Pulm: Breathing Normal    Lymph Nodes: No Head and Neck Lymphadenopathy     Neuro/Psych: Orientation:Alert and Orientedx3 ; Mood/Affect:normal       A/P:  1. Seborrheic keratosis, lentigo, angioma, dermal nevus, hx of melanoma  MELANOMA DISCUSSED WITH PATIENT:  I discussed the specifics of tumor, prognosis, metachronous melanoma, self exam, and genetics with the patient. I explained the need for monthly skin exams including and taught the patient how to do this. Patient was asked about new or changing moles . I discussed with patient signs and symptoms that could arise in the setting of recurrent " locoregional or metastatic disease. In addition, the need to undergo every 4 month dermatologic full skin survey and evaluation given that patients with a diagnosis of melanoma are at risk of recurrence (local and distant) and of subsequent de pablo melanoma.      It was a pleasure speaking to Noam Del Real today.  Previous clinic notes and pertinent laboratory tests were reviewed prior to Noam Del Real's visit.  Nature and genetics of benign skin lesions dicussed with patient.  Signs and Symptoms of skin cancer discussed with patient.  Patient encouraged to perform monthly skin exams.  UV precautions reviewed with patient.  Return to clinic 4 months  Noam to follow up with Primary Care provider regarding elevated blood pressure.

## 2021-08-17 NOTE — NURSING NOTE
"Initial BP (!) 157/78   Pulse 67   Ht 1.93 m (6' 4\")   BMI 22.11 kg/m   Estimated body mass index is 22.11 kg/m  as calculated from the following:    Height as of this encounter: 1.93 m (6' 4\").    Weight as of 4/24/20: 82.4 kg (181 lb 9.6 oz). .      "

## 2021-08-17 NOTE — LETTER
8/17/2021         RE: Noam Del Real  [address] [facility] [address]        Dear Colleague,    Thank you for referring your patient, Noam Del Real, to the [facility]. Please see a copy of my visit note below.    Noam Del Real is an extremely pleasant 60 year old year old male patient here today for hx of 0.5mm melanoma on back.  HE denies any new or changing skin lesions.  He cut arm cut at work with cabinent but otherwise doing well.  Patient has no other skin complaints today.  Remainder of the HPI, Meds, PMH, Allergies, FH, and SH was reviewed in chart.      Past Medical History:   Diagnosis Date     Malignant melanoma (H)        Past Surgical History:   Procedure Laterality Date     C ANESTH,LOWER ARM SURGERY  2002    Left     DENTAL SURGERY      [place]         Family History   Problem Relation Age of Onset     Prostate Cancer Father         Removal of prostate for elevated PSA. Unsure of cancer.        Social History     Socioeconomic History     Marital status:      Spouse name: Belkis Ledesma     Number of children: 0     Years of education: 12+     Highest education level: Not on file   Occupational History     Employer: OTHER   Tobacco Use     Smoking status: Never Smoker     Smokeless tobacco: Never Used   Substance and Sexual Activity     Alcohol use: Yes     Comment: Rarely     Drug use: No     Sexual activity: Yes     Partners: Female   Other Topics Concern     Parent/sibling w/ CABG, MI or angioplasty before 65F 55M? No   Social History Narrative     Not on file     Social Determinants of Health     Financial Resource Strain:      Difficulty of Paying Living Expenses:    Food Insecurity:      Worried About Running Out of Food in the Last Year:      Ran Out of Food in the Last Year:    Transportation Needs:      Lack of Transportation (Medical):      Lack of Transportation (Non-Medical):    Physical Activity:      Days of Exercise per Week:      Minutes of  "Exercise per Session:    Stress:      Feeling of Stress :    Social Connections:      Frequency of Communication with Friends and Family:      Frequency of Social Gatherings with Friends and Family:      Attends Oriental orthodox Services:      Active Member of Clubs or Organizations:      Attends Club or Organization Meetings:      Marital Status:    Intimate Partner Violence:      Fear of Current or Ex-Partner:      Emotionally Abused:      Physically Abused:      Sexually Abused:        Outpatient Encounter Medications as of 8/17/2021   Medication Sig Dispense Refill     NO ACTIVE MEDICATIONS        No facility-administered encounter medications on file as of 8/17/2021.             O:   NAD, WDWN, Alert & Oriented, Mood & Affect wnl, Vitals stable   Here today alone   BP (!) 157/78   Pulse 67   Ht 1.93 m (6' 4\")   BMI 22.11 kg/m     General appearance normal   Vitals stable   Alert, oriented and in no acute distress      Following lymph nodes palpated: Occipital, Cervical, Supraclavicular , axilla, inguinal no lad      Stuck on papules and brown macules on trunk and ext   Red papules on trunk  Flesh colored papules on trunk   Abrasion right arm   The remainder of the full exam was normal; the following areas were examined:  conjunctiva/lids, oral mucosa, neck, peripheral vascular system, abdomen, lymph nodes, digits/nails, eccrine and apocrine glands, scalp/hair, face, neck, chest, abdomen, buttocks, back, RUE, LUE, RLE, LLE       Eyes: Conjunctivae/lids:Normal     ENT: Lips, buccal mucosa, tongue: normal    MSK:Normal    Cardiovascular: peripheral edema none    Pulm: Breathing Normal    Lymph Nodes: No Head and Neck Lymphadenopathy     Neuro/Psych: Orientation:Alert and Orientedx3 ; Mood/Affect:normal       A/P:  1. Seborrheic keratosis, lentigo, angioma, dermal nevus, hx of melanoma  MELANOMA DISCUSSED WITH PATIENT:  I discussed the specifics of tumor, prognosis, metachronous melanoma, self exam, and genetics with " the patient. I explained the need for monthly skin exams including and taught the patient how to do this. Patient was asked about new or changing moles . I discussed with patient signs and symptoms that could arise in the setting of recurrent locoregional or metastatic disease. In addition, the need to undergo every 4 month dermatologic full skin survey and evaluation given that patients with a diagnosis of melanoma are at risk of recurrence (local and distant) and of subsequent de pablo melanoma.      It was a pleasure speaking to Noam Del Real today.  Previous clinic notes and pertinent laboratory tests were reviewed prior to Noam Del Real's visit.  Nature and genetics of benign skin lesions dicussed with patient.  Signs and Symptoms of skin cancer discussed with patient.  Patient encouraged to perform monthly skin exams.  UV precautions reviewed with patient.  Return to clinic 4 months  Noam to follow up with Primary Care provider regarding elevated blood pressure.        Again, thank you for allowing me to participate in the care of your patient.        Sincerely,        Mayito Beltran MD

## 2021-12-21 ENCOUNTER — OFFICE VISIT (OUTPATIENT)
Dept: DERMATOLOGY | Facility: CLINIC | Age: 61
End: 2021-12-21
Payer: COMMERCIAL

## 2021-12-21 VITALS — OXYGEN SATURATION: 98 % | HEART RATE: 60 BPM | SYSTOLIC BLOOD PRESSURE: 144 MMHG | DIASTOLIC BLOOD PRESSURE: 73 MMHG

## 2021-12-21 DIAGNOSIS — D18.01 ANGIOMA OF SKIN: ICD-10-CM

## 2021-12-21 DIAGNOSIS — D23.9 DERMAL NEVUS: ICD-10-CM

## 2021-12-21 DIAGNOSIS — Z85.820 HISTORY OF MELANOMA: Primary | ICD-10-CM

## 2021-12-21 DIAGNOSIS — L81.4 LENTIGO: ICD-10-CM

## 2021-12-21 DIAGNOSIS — L82.1 SEBORRHEIC KERATOSIS: ICD-10-CM

## 2021-12-21 PROCEDURE — 99213 OFFICE O/P EST LOW 20 MIN: CPT | Performed by: DERMATOLOGY

## 2021-12-21 NOTE — NURSING NOTE
"Initial BP (!) 144/73   Pulse 60   SpO2 98%  Estimated body mass index is 22.11 kg/m  as calculated from the following:    Height as of 8/17/21: 1.93 m (6' 4\").    Weight as of 4/24/20: 82.4 kg (181 lb 9.6 oz). .      "

## 2021-12-21 NOTE — PROGRESS NOTES
Noam Del Real is an extremely pleasant 61 year old year old male patient here today for hx of 0.5mm melanoma on back.  He denies any new or changing skin lesions.  Patient has no other skin complaints today.  Remainder of the HPI, Meds, PMH, Allergies, FH, and SH was reviewed in chart.      Past Medical History:   Diagnosis Date     Malignant melanoma (H)        Past Surgical History:   Procedure Laterality Date     C ANESTH,LOWER ARM SURGERY  2002    Left     DENTAL SURGERY      [place]         Family History   Problem Relation Age of Onset     Prostate Cancer Father         Removal of prostate for elevated PSA. Unsure of cancer.        Social History     Socioeconomic History     Marital status:      Spouse name: Belkis Ledesma     Number of children: 0     Years of education: 12+     Highest education level: Not on file   Occupational History     Employer: OTHER   Tobacco Use     Smoking status: Never Smoker     Smokeless tobacco: Never Used   Substance and Sexual Activity     Alcohol use: Yes     Comment: Rarely     Drug use: No     Sexual activity: Yes     Partners: Female   Other Topics Concern     Parent/sibling w/ CABG, MI or angioplasty before 65F 55M? No   Social History Narrative     Not on file     Social Determinants of Health     Financial Resource Strain: Not on file   Food Insecurity: Not on file   Transportation Needs: Not on file   Physical Activity: Not on file   Stress: Not on file   Social Connections: Not on file   Intimate Partner Violence: Not on file   Housing Stability: Not on file       Outpatient Encounter Medications as of 12/21/2021   Medication Sig Dispense Refill     NO ACTIVE MEDICATIONS        No facility-administered encounter medications on file as of 12/21/2021.             O:   NAD, WDWN, Alert & Oriented, Mood & Affect wnl, Vitals stable   Here today alone   BP (!) 144/73   Pulse 60   SpO2 98%    General appearance normal   Vitals stable   Alert, oriented and in no  acute distress      Following lymph nodes palpated: Occipital, Cervical, Supraclavicular , axilla, and inguinal no lad          Stuck on papules and brown macules on trunk and ext   Red papules on trunk  Flesh colored papules on trunk     The remainder of the full exam was normal; the following areas were examined:  conjunctiva/lids, oral mucosa, neck, peripheral vascular system, abdomen, lymph nodes, digits/nails, eccrine and apocrine glands, scalp/hair, face, neck, chest, abdomen, buttocks, back, RUE, LUE, RLE, LLE       Eyes: Conjunctivae/lids:Normal     ENT: Lips, buccal mucosa, tongue: normal    MSK:Normal    Cardiovascular: peripheral edema none    Pulm: Breathing Normal    Lymph Nodes: No Head and Neck Lymphadenopathy     Neuro/Psych: Orientation:Alert and Orientedx3 ; Mood/Affect:normal       A/P:  1. Seborrheic keratosis, lentigo, angioma, dermal nevus, hx of melanoma  MELANOMA DISCUSSED WITH PATIENT:  I discussed the specifics of tumor, prognosis, metachronous melanoma, self exam, and genetics with the patient. I explained the need for monthly skin exams including and taught the patient how to do this. Patient was asked about new or changing moles . I discussed with patient signs and symptoms that could arise in the setting of recurrent locoregional or metastatic disease. In addition, the need to undergo every 4 month dermatologic full skin survey and evaluation given that patients with a diagnosis of melanoma are at risk of recurrence (local and distant) and of subsequent de pablo melanoma.      It was a pleasure speaking to Noam Del Real today.  Previous clinic notes and pertinent laboratory tests were reviewed prior to Noam Del Real's visit.  Nature and genetics of benign skin lesions dicussed with patient.  Signs and Symptoms of skin cancer discussed with patient.  Patient encouraged to perform monthly skin exams.  UV precautions reviewed with patient.  Risks of non-melanoma skin cancer discussed with  patient   Return to clinic 4 months

## 2021-12-21 NOTE — LETTER
12/21/2021         RE: Noam Del Real  [address] [facility] [address]        Dear Colleague,    Thank you for referring your patient, Noam Del Real, to the [facility]. Please see a copy of my visit note below.    Noam Del Real is an extremely pleasant 61 year old year old male patient here today for hx of 0.5mm melanoma on back.  He denies any new or changing skin lesions.  Patient has no other skin complaints today.  Remainder of the HPI, Meds, PMH, Allergies, FH, and SH was reviewed in chart.      Past Medical History:   Diagnosis Date     Malignant melanoma (H)        Past Surgical History:   Procedure Laterality Date     C ANESTH,LOWER ARM SURGERY  2002    Left     DENTAL SURGERY      [place]         Family History   Problem Relation Age of Onset     Prostate Cancer Father         Removal of prostate for elevated PSA. Unsure of cancer.        Social History     Socioeconomic History     Marital status:      Spouse name: Belkis Ledesma     Number of children: 0     Years of education: 12+     Highest education level: Not on file   Occupational History     Employer: OTHER   Tobacco Use     Smoking status: Never Smoker     Smokeless tobacco: Never Used   Substance and Sexual Activity     Alcohol use: Yes     Comment: Rarely     Drug use: No     Sexual activity: Yes     Partners: Female   Other Topics Concern     Parent/sibling w/ CABG, MI or angioplasty before 65F 55M? No   Social History Narrative     Not on file     Social Determinants of Health     Financial Resource Strain: Not on file   Food Insecurity: Not on file   Transportation Needs: Not on file   Physical Activity: Not on file   Stress: Not on file   Social Connections: Not on file   Intimate Partner Violence: Not on file   Housing Stability: Not on file       Outpatient Encounter Medications as of 12/21/2021   Medication Sig Dispense Refill     NO ACTIVE MEDICATIONS        No facility-administered encounter  medications on file as of 12/21/2021.             O:   NAD, WDWN, Alert & Oriented, Mood & Affect wnl, Vitals stable   Here today alone   BP (!) 144/73   Pulse 60   SpO2 98%    General appearance normal   Vitals stable   Alert, oriented and in no acute distress      Following lymph nodes palpated: Occipital, Cervical, Supraclavicular , axilla, and inguinal no lad          Stuck on papules and brown macules on trunk and ext   Red papules on trunk  Flesh colored papules on trunk     The remainder of the full exam was normal; the following areas were examined:  conjunctiva/lids, oral mucosa, neck, peripheral vascular system, abdomen, lymph nodes, digits/nails, eccrine and apocrine glands, scalp/hair, face, neck, chest, abdomen, buttocks, back, RUE, LUE, RLE, LLE       Eyes: Conjunctivae/lids:Normal     ENT: Lips, buccal mucosa, tongue: normal    MSK:Normal    Cardiovascular: peripheral edema none    Pulm: Breathing Normal    Lymph Nodes: No Head and Neck Lymphadenopathy     Neuro/Psych: Orientation:Alert and Orientedx3 ; Mood/Affect:normal       A/P:  1. Seborrheic keratosis, lentigo, angioma, dermal nevus, hx of melanoma  MELANOMA DISCUSSED WITH PATIENT:  I discussed the specifics of tumor, prognosis, metachronous melanoma, self exam, and genetics with the patient. I explained the need for monthly skin exams including and taught the patient how to do this. Patient was asked about new or changing moles . I discussed with patient signs and symptoms that could arise in the setting of recurrent locoregional or metastatic disease. In addition, the need to undergo every 4 month dermatologic full skin survey and evaluation given that patients with a diagnosis of melanoma are at risk of recurrence (local and distant) and of subsequent de pablo melanoma.      It was a pleasure speaking to Noam Del Real today.  Previous clinic notes and pertinent laboratory tests were reviewed prior to Noam Del Real's visit.  Nature and  genetics of benign skin lesions dicussed with patient.  Signs and Symptoms of skin cancer discussed with patient.  Patient encouraged to perform monthly skin exams.  UV precautions reviewed with patient.  Risks of non-melanoma skin cancer discussed with patient   Return to clinic 4 months        Again, thank you for allowing me to participate in the care of your patient.        Sincerely,        Mayito Beltran MD

## 2022-05-11 ENCOUNTER — DOCUMENTATION ONLY (OUTPATIENT)
Dept: OTHER | Facility: CLINIC | Age: 62
End: 2022-05-11
Payer: COMMERCIAL

## 2022-08-01 ENCOUNTER — OFFICE VISIT (OUTPATIENT)
Dept: DERMATOLOGY | Facility: CLINIC | Age: 62
End: 2022-08-01
Payer: COMMERCIAL

## 2022-08-01 DIAGNOSIS — D23.9 DERMAL NEVUS: ICD-10-CM

## 2022-08-01 DIAGNOSIS — L81.4 LENTIGO: ICD-10-CM

## 2022-08-01 DIAGNOSIS — D18.01 ANGIOMA OF SKIN: ICD-10-CM

## 2022-08-01 DIAGNOSIS — Z85.820 HISTORY OF MELANOMA: ICD-10-CM

## 2022-08-01 DIAGNOSIS — L82.1 SEBORRHEIC KERATOSIS: Primary | ICD-10-CM

## 2022-08-01 DIAGNOSIS — L82.0 INFLAMED SEBORRHEIC KERATOSIS: ICD-10-CM

## 2022-08-01 PROCEDURE — 17110 DESTRUCTION B9 LES UP TO 14: CPT | Performed by: DERMATOLOGY

## 2022-08-01 PROCEDURE — 99213 OFFICE O/P EST LOW 20 MIN: CPT | Mod: 25 | Performed by: DERMATOLOGY

## 2022-08-01 ASSESSMENT — PAIN SCALES - GENERAL: PAINLEVEL: NO PAIN (0)

## 2022-08-01 NOTE — PROGRESS NOTES
Noam Del Real is an extremely pleasant 61 year old year old male patient here today for hx of melanoma 0.5mm on back. HE notes spot on right leg and l neck.   .   Patient states this has been present for a while.  Patient reports the following symptoms:  new.  Patient reports the following previous treatments none.  These treatments did not work.  Patient reports the following modifying factors none.  Associated symptoms: none.  Patient has no other skin complaints today.  Remainder of the HPI, Meds, PMH, Allergies, FH, and SH was reviewed in chart.      Past Medical History:   Diagnosis Date     Malignant melanoma (H)        Past Surgical History:   Procedure Laterality Date     DENTAL SURGERY      [place]      ZZC ANESTH,LOWER ARM SURGERY  2002    Left        Family History   Problem Relation Age of Onset     Prostate Cancer Father         Removal of prostate for elevated PSA. Unsure of cancer.        Social History     Socioeconomic History     Marital status:      Spouse name: Belkis Ledesma     Number of children: 0     Years of education: 12+     Highest education level: Not on file   Occupational History     Employer: OTHER   Tobacco Use     Smoking status: Never Smoker     Smokeless tobacco: Never Used   Substance and Sexual Activity     Alcohol use: Yes     Comment: Rarely     Drug use: No     Sexual activity: Yes     Partners: Female   Other Topics Concern     Parent/sibling w/ CABG, MI or angioplasty before 65F 55M? No   Social History Narrative     Not on file     Social Determinants of Health     Financial Resource Strain: Not on file   Food Insecurity: Not on file   Transportation Needs: Not on file   Physical Activity: Not on file   Stress: Not on file   Social Connections: Not on file   Intimate Partner Violence: Not on file   Housing Stability: Not on file       Outpatient Encounter Medications as of 8/1/2022   Medication Sig Dispense Refill     NO ACTIVE MEDICATIONS        No  facility-administered encounter medications on file as of 8/1/2022.             O:   NAD, WDWN, Alert & Oriented, Mood & Affect wnl, Vitals stable   Here today alone   General appearance normal   Vitals stable   Alert, oriented and in no acute distress      Following lymph nodes palpated: Occipital, Cervical, Supraclavicular , inguinal, axilla no lad  R leg inflamed seborrheic keratosis   L neck red papule      Stuck on papules and brown macules on trunk and ext   Red papules on trunk  Flesh colored papules on trunk     The remainder of the full exam was normal; the following areas were examined:  conjunctiva/lids, oral mucosa, neck, peripheral vascular system, abdomen, lymph nodes, digits/nails, eccrine and apocrine glands, scalp/hair, face, neck, chest, abdomen, buttocks, back, RUE, LUE, RLE, LLE       Eyes: Conjunctivae/lids:Normal     ENT: Lips, buccal mucosa, tongue: normal    MSK:Normal    Cardiovascular: peripheral edema none    Pulm: Breathing Normal    Lymph Nodes: No Head and Neck Lymphadenopathy     Neuro/Psych: Orientation:Alert and Orientedx3 ; Mood/Affect:normal       A/P:  1. Seborrheic keratosis, lentigo, angioma, dermal nevus, history of melanoma  2. R leg inflamed seborrheic keratosis   LN2:  Treated with LN2 for 5s for 1-2 cycles. Warned risks of blistering, pain, pigment change, scarring, and incomplete resolution.  Advised patient to return if lesions do not completely resolve.  Wound care sheet given.  It was a pleasure speaking to Noam Del Real today.  Previous clinic notes and pertinent laboratory tests were reviewed prior to Noam Del Real's visit.  Patient encouraged to perform monthly skin exams.  UV precautions reviewed with patient.  Return to clinic 6 months

## 2022-08-01 NOTE — LETTER
8/1/2022         RE: Noam Del Real  [address] [facility] [address]        Dear Colleague,    Thank you for referring your patient, Noam Del Real, to the [facility]. Please see a copy of my visit note below.    Noam Del Real is an extremely pleasant 61 year old year old male patient here today for hx of melanoma 0.5mm on back. HE notes spot on right leg and l neck.   .   Patient states this has been present for a while.  Patient reports the following symptoms:  new.  Patient reports the following previous treatments none.  These treatments did not work.  Patient reports the following modifying factors none.  Associated symptoms: none.  Patient has no other skin complaints today.  Remainder of the HPI, Meds, PMH, Allergies, FH, and SH was reviewed in chart.      Past Medical History:   Diagnosis Date     Malignant melanoma (H)        Past Surgical History:   Procedure Laterality Date     DENTAL SURGERY      [place]      ZZC ANESTH,LOWER ARM SURGERY  2002    Left        Family History   Problem Relation Age of Onset     Prostate Cancer Father         Removal of prostate for elevated PSA. Unsure of cancer.        Social History     Socioeconomic History     Marital status:      Spouse name: Belkis Ledesma     Number of children: 0     Years of education: 12+     Highest education level: Not on file   Occupational History     Employer: OTHER   Tobacco Use     Smoking status: Never Smoker     Smokeless tobacco: Never Used   Substance and Sexual Activity     Alcohol use: Yes     Comment: Rarely     Drug use: No     Sexual activity: Yes     Partners: Female   Other Topics Concern     Parent/sibling w/ CABG, MI or angioplasty before 65F 55M? No   Social History Narrative     Not on file     Social Determinants of Health     Financial Resource Strain: Not on file   Food Insecurity: Not on file   Transportation Needs: Not on file   Physical Activity: Not on file   Stress: Not on file    Social Connections: Not on file   Intimate Partner Violence: Not on file   Housing Stability: Not on file       Outpatient Encounter Medications as of 8/1/2022   Medication Sig Dispense Refill     NO ACTIVE MEDICATIONS        No facility-administered encounter medications on file as of 8/1/2022.             O:   NAD, WDWN, Alert & Oriented, Mood & Affect wnl, Vitals stable   Here today alone   General appearance normal   Vitals stable   Alert, oriented and in no acute distress      Following lymph nodes palpated: Occipital, Cervical, Supraclavicular , inguinal, axilla no lad  R leg inflamed seborrheic keratosis   L neck red papule      Stuck on papules and brown macules on trunk and ext   Red papules on trunk  Flesh colored papules on trunk     The remainder of the full exam was normal; the following areas were examined:  conjunctiva/lids, oral mucosa, neck, peripheral vascular system, abdomen, lymph nodes, digits/nails, eccrine and apocrine glands, scalp/hair, face, neck, chest, abdomen, buttocks, back, RUE, LUE, RLE, LLE       Eyes: Conjunctivae/lids:Normal     ENT: Lips, buccal mucosa, tongue: normal    MSK:Normal    Cardiovascular: peripheral edema none    Pulm: Breathing Normal    Lymph Nodes: No Head and Neck Lymphadenopathy     Neuro/Psych: Orientation:Alert and Orientedx3 ; Mood/Affect:normal       A/P:  1. Seborrheic keratosis, lentigo, angioma, dermal nevus, history of melanoma  2. R leg inflamed seborrheic keratosis   LN2:  Treated with LN2 for 5s for 1-2 cycles. Warned risks of blistering, pain, pigment change, scarring, and incomplete resolution.  Advised patient to return if lesions do not completely resolve.  Wound care sheet given.  It was a pleasure speaking to Noam Del Real today.  Previous clinic notes and pertinent laboratory tests were reviewed prior to Noam Del Real's visit.  Patient encouraged to perform monthly skin exams.  UV precautions reviewed with patient.  Return to clinic 6  months        Again, thank you for allowing me to participate in the care of your patient.        Sincerely,        Mayito Beltran MD

## 2023-02-01 ENCOUNTER — OFFICE VISIT (OUTPATIENT)
Dept: DERMATOLOGY | Facility: CLINIC | Age: 63
End: 2023-02-01
Payer: COMMERCIAL

## 2023-02-01 VITALS — DIASTOLIC BLOOD PRESSURE: 69 MMHG | HEART RATE: 73 BPM | SYSTOLIC BLOOD PRESSURE: 145 MMHG

## 2023-02-01 DIAGNOSIS — L82.1 SEBORRHEIC KERATOSIS: ICD-10-CM

## 2023-02-01 DIAGNOSIS — D18.01 ANGIOMA OF SKIN: Primary | ICD-10-CM

## 2023-02-01 DIAGNOSIS — Z85.820 HISTORY OF MELANOMA: ICD-10-CM

## 2023-02-01 DIAGNOSIS — L81.4 LENTIGO: ICD-10-CM

## 2023-02-01 DIAGNOSIS — D23.9 DERMAL NEVUS: ICD-10-CM

## 2023-02-01 PROCEDURE — 17110 DESTRUCTION B9 LES UP TO 14: CPT | Performed by: DERMATOLOGY

## 2023-02-01 PROCEDURE — 99213 OFFICE O/P EST LOW 20 MIN: CPT | Mod: 25 | Performed by: DERMATOLOGY

## 2023-02-01 ASSESSMENT — PAIN SCALES - GENERAL: PAINLEVEL: NO PAIN (0)

## 2023-02-01 NOTE — NURSING NOTE
"Initial BP (!) 145/69   Pulse 73  Estimated body mass index is 22.11 kg/m  as calculated from the following:    Height as of 8/17/21: 1.93 m (6' 4\").    Weight as of 4/24/20: 82.4 kg (181 lb 9.6 oz). .      "

## 2023-02-01 NOTE — PROGRESS NOTES
Noam Del Real is an extremely pleasant 62 year old year old male patient here today for hx of melanoma 0.5mm on back.  HE notesbleeding red spot on right cheek.   .   Patient states this has been present for a while.  Patient reports the following symptoms:  Bleeding.  .  Patient reports the following previous treatments none.  These treatments did not work.  Patient reports the following modifying factors none.  Associated symptoms: none.  Patient has no other skin complaints today.  Remainder of the HPI, Meds, PMH, Allergies, FH, and SH was reviewed in chart.      Past Medical History:   Diagnosis Date     Malignant melanoma (H)        Past Surgical History:   Procedure Laterality Date     DENTAL SURGERY      [place]      ZZC ANESTH,LOWER ARM SURGERY  2002    Left        Family History   Problem Relation Age of Onset     Prostate Cancer Father         Removal of prostate for elevated PSA. Unsure of cancer.        Social History     Socioeconomic History     Marital status:      Spouse name: Belkis Ledesma     Number of children: 0     Years of education: 12+     Highest education level: Not on file   Occupational History     Employer: OTHER   Tobacco Use     Smoking status: Never     Smokeless tobacco: Never   Substance and Sexual Activity     Alcohol use: Yes     Comment: Rarely     Drug use: No     Sexual activity: Yes     Partners: Female   Other Topics Concern     Parent/sibling w/ CABG, MI or angioplasty before 65F 55M? No   Social History Narrative     Not on file     Social Determinants of Health     Financial Resource Strain: Not on file   Food Insecurity: Not on file   Transportation Needs: Not on file   Physical Activity: Not on file   Stress: Not on file   Social Connections: Not on file   Intimate Partner Violence: Not on file   Housing Stability: Not on file       Outpatient Encounter Medications as of 2/1/2023   Medication Sig Dispense Refill     NO ACTIVE MEDICATIONS        No  facility-administered encounter medications on file as of 2/1/2023.             O:   NAD, WDWN, Alert & Oriented, Mood & Affect wnl, Vitals stable   Here today alone   BP (!) 145/69   Pulse 73    General appearance normal   Vitals stable   Alert, oriented and in no acute distress      Following lymph nodes palpated: Occipital, Cervical, Supraclavicular , inguinal no lad  R cheek angioma      Stuck on papules and brown macules on trunk and ext   Red papules on trunk  Flesh colored papules on trunk     The remainder of the full exam was normal; the following areas were examined:  conjunctiva/lids, oral mucosa, neck, peripheral vascular system, abdomen, lymph nodes, digits/nails, eccrine and apocrine glands, scalp/hair, face, neck, chest, abdomen, buttocks, back, RUE, LUE, RLE, LLE       Eyes: Conjunctivae/lids:Normal     ENT: Lips, buccal mucosa, tongue: normal    MSK:Normal    Cardiovascular: peripheral edema none    Pulm: Breathing Normal    Lymph Nodes: No Head and Neck Lymphadenopathy     Neuro/Psych: Orientation:Alert and Orientedx3 ; Mood/Affect:normal       A/P:  1. Seborrheic keratosis, lentigo, angioma, dermal nevus, hx of melanoma  2. R cheek angiomableeding  Cautery After consent, anesthesia with LE and prep, cautery performed.  No complications and routine wound care.    It was a pleasure speaking to Noam Del Real today.  Previous clinic notes and pertinent laboratory tests were reviewed prior to Noam Del Real's visit.  Nature of benign skin lesions dicussed with patient.  Signs and Symptoms of skin cancer discussed with patient.  Patient encouraged to perform monthly skin exams.  UV precautions reviewed with patient.  Return to clinic 6 months

## 2023-02-01 NOTE — LETTER
2/1/2023         RE: Noam Del Real  [address] [place] [address]        Dear Colleague,    Thank you for referring your patient, Noam Del Real, to the [facility]. Please see a copy of my visit note below.    Noam Del Real is an extremely pleasant 62 year old year old male patient here today for hx of melanoma 0.5mm on back.  HE notesbleeding red spot on right cheek.   .   Patient states this has been present for a while.  Patient reports the following symptoms:  Bleeding.  .  Patient reports the following previous treatments none.  These treatments did not work.  Patient reports the following modifying factors none.  Associated symptoms: none.  Patient has no other skin complaints today.  Remainder of the HPI, Meds, PMH, Allergies, FH, and SH was reviewed in chart.      Past Medical History:   Diagnosis Date     Malignant melanoma (H)        Past Surgical History:   Procedure Laterality Date     DENTAL SURGERY      [place]      ZZC ANESTH,LOWER ARM SURGERY  2002    Left        Family History   Problem Relation Age of Onset     Prostate Cancer Father         Removal of prostate for elevated PSA. Unsure of cancer.        Social History     Socioeconomic History     Marital status:      Spouse name: Belkis Ledesma     Number of children: 0     Years of education: 12+     Highest education level: Not on file   Occupational History     Employer: OTHER   Tobacco Use     Smoking status: Never     Smokeless tobacco: Never   Substance and Sexual Activity     Alcohol use: Yes     Comment: Rarely     Drug use: No     Sexual activity: Yes     Partners: Female   Other Topics Concern     Parent/sibling w/ CABG, MI or angioplasty before 65F 55M? No   Social History Narrative     Not on file     Social Determinants of Health     Financial Resource Strain: Not on file   Food Insecurity: Not on file   Transportation Needs: Not on file   Physical Activity: Not on file   Stress: Not on file    Social Connections: Not on file   Intimate Partner Violence: Not on file   Housing Stability: Not on file       Outpatient Encounter Medications as of 2/1/2023   Medication Sig Dispense Refill     NO ACTIVE MEDICATIONS        No facility-administered encounter medications on file as of 2/1/2023.             O:   NAD, WDWN, Alert & Oriented, Mood & Affect wnl, Vitals stable   Here today alone   BP (!) 145/69   Pulse 73    General appearance normal   Vitals stable   Alert, oriented and in no acute distress      Following lymph nodes palpated: Occipital, Cervical, Supraclavicular , inguinal no lad  R cheek angioma      Stuck on papules and brown macules on trunk and ext   Red papules on trunk  Flesh colored papules on trunk     The remainder of the full exam was normal; the following areas were examined:  conjunctiva/lids, oral mucosa, neck, peripheral vascular system, abdomen, lymph nodes, digits/nails, eccrine and apocrine glands, scalp/hair, face, neck, chest, abdomen, buttocks, back, RUE, LUE, RLE, LLE       Eyes: Conjunctivae/lids:Normal     ENT: Lips, buccal mucosa, tongue: normal    MSK:Normal    Cardiovascular: peripheral edema none    Pulm: Breathing Normal    Lymph Nodes: No Head and Neck Lymphadenopathy     Neuro/Psych: Orientation:Alert and Orientedx3 ; Mood/Affect:normal       A/P:  1. Seborrheic keratosis, lentigo, angioma, dermal nevus, hx of melanoma  2. R cheek angiomableeding  Cautery After consent, anesthesia with LE and prep, cautery performed.  No complications and routine wound care.    It was a pleasure speaking to Noam Del Real today.  Previous clinic notes and pertinent laboratory tests were reviewed prior to Noam Del Real's visit.  Nature of benign skin lesions dicussed with patient.  Signs and Symptoms of skin cancer discussed with patient.  Patient encouraged to perform monthly skin exams.  UV precautions reviewed with patient.  Return to clinic 6 months        Again, thank you for  allowing me to participate in the care of your patient.        Sincerely,        Mayito Beltran MD

## 2023-03-24 ENCOUNTER — LAB (OUTPATIENT)
Dept: FAMILY MEDICINE | Facility: CLINIC | Age: 63
End: 2023-03-24

## 2023-03-24 ENCOUNTER — OFFICE VISIT (OUTPATIENT)
Dept: FAMILY MEDICINE | Facility: CLINIC | Age: 63
End: 2023-03-24
Payer: COMMERCIAL

## 2023-03-24 VITALS
BODY MASS INDEX: 23.61 KG/M2 | RESPIRATION RATE: 16 BRPM | WEIGHT: 184 LBS | HEART RATE: 72 BPM | HEIGHT: 74 IN | OXYGEN SATURATION: 98 % | DIASTOLIC BLOOD PRESSURE: 78 MMHG | TEMPERATURE: 98.6 F | SYSTOLIC BLOOD PRESSURE: 161 MMHG

## 2023-03-24 DIAGNOSIS — Z11.59 NEED FOR HEPATITIS C SCREENING TEST: ICD-10-CM

## 2023-03-24 DIAGNOSIS — Z12.11 SCREEN FOR COLON CANCER: ICD-10-CM

## 2023-03-24 DIAGNOSIS — R03.0 ELEVATED BLOOD PRESSURE READING WITHOUT DIAGNOSIS OF HYPERTENSION: ICD-10-CM

## 2023-03-24 DIAGNOSIS — J30.2 SEASONAL ALLERGIES: Primary | ICD-10-CM

## 2023-03-24 DIAGNOSIS — Z11.4 SCREENING FOR HIV (HUMAN IMMUNODEFICIENCY VIRUS): ICD-10-CM

## 2023-03-24 DIAGNOSIS — Z13.6 CARDIOVASCULAR SCREENING; LDL GOAL LESS THAN 160: ICD-10-CM

## 2023-03-24 DIAGNOSIS — R07.9 EXERTIONAL CHEST PAIN: ICD-10-CM

## 2023-03-24 PROCEDURE — 90471 IMMUNIZATION ADMIN: CPT | Performed by: PHYSICIAN ASSISTANT

## 2023-03-24 PROCEDURE — 99214 OFFICE O/P EST MOD 30 MIN: CPT | Mod: 25 | Performed by: PHYSICIAN ASSISTANT

## 2023-03-24 PROCEDURE — 90715 TDAP VACCINE 7 YRS/> IM: CPT | Performed by: PHYSICIAN ASSISTANT

## 2023-03-24 RX ORDER — LEVOCETIRIZINE DIHYDROCHLORIDE 5 MG/1
5 TABLET, FILM COATED ORAL EVERY EVENING
Qty: 90 TABLET | Refills: 3 | Status: SHIPPED | OUTPATIENT
Start: 2023-03-24

## 2023-03-24 RX ORDER — LEVOCETIRIZINE DIHYDROCHLORIDE 5 MG/1
5 TABLET, FILM COATED ORAL EVERY EVENING
COMMUNITY
End: 2023-03-24

## 2023-03-24 ASSESSMENT — ENCOUNTER SYMPTOMS
NUMBNESS: 1
CHEST TIGHTNESS: 1
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
## 2023-03-29 ENCOUNTER — LAB (OUTPATIENT)
Dept: LAB | Facility: CLINIC | Age: 63
End: 2023-03-29
Payer: COMMERCIAL

## 2023-03-29 DIAGNOSIS — Z11.59 NEED FOR HEPATITIS C SCREENING TEST: ICD-10-CM

## 2023-03-29 DIAGNOSIS — R03.0 ELEVATED BLOOD PRESSURE READING WITHOUT DIAGNOSIS OF HYPERTENSION: ICD-10-CM

## 2023-03-29 DIAGNOSIS — Z13.6 CARDIOVASCULAR SCREENING; LDL GOAL LESS THAN 160: ICD-10-CM

## 2023-03-29 DIAGNOSIS — Z11.4 SCREENING FOR HIV (HUMAN IMMUNODEFICIENCY VIRUS): ICD-10-CM

## 2023-03-29 LAB
ALBUMIN SERPL BCG-MCNC: 4.5 G/DL (ref 3.5–5.2)
ALP SERPL-CCNC: 81 U/L (ref 40–129)
ALT SERPL W P-5'-P-CCNC: 45 U/L (ref 10–50)
ANION GAP SERPL CALCULATED.3IONS-SCNC: 11 MMOL/L (ref 7–15)
AST SERPL W P-5'-P-CCNC: 33 U/L (ref 10–50)
BILIRUB SERPL-MCNC: 0.5 MG/DL
BUN SERPL-MCNC: 18.1 MG/DL (ref 8–23)
CALCIUM SERPL-MCNC: 9.8 MG/DL (ref 8.8–10.2)
CHLORIDE SERPL-SCNC: 101 MMOL/L (ref 98–107)
CHOLEST SERPL-MCNC: 214 MG/DL
CREAT SERPL-MCNC: 0.9 MG/DL (ref 0.67–1.17)
DEPRECATED HCO3 PLAS-SCNC: 29 MMOL/L (ref 22–29)
GFR SERPL CREATININE-BSD FRML MDRD: >90 ML/MIN/1.73M2
GLUCOSE SERPL-MCNC: 119 MG/DL (ref 70–99)
HBA1C MFR BLD: 5.2 % (ref 0–5.6)
HCV AB SERPL QL IA: NONREACTIVE
HDLC SERPL-MCNC: 61 MG/DL
HIV 1+2 AB+HIV1 P24 AG SERPL QL IA: NONREACTIVE
LDLC SERPL CALC-MCNC: 133 MG/DL
NONHDLC SERPL-MCNC: 153 MG/DL
POTASSIUM SERPL-SCNC: 4.4 MMOL/L (ref 3.4–5.3)
PROT SERPL-MCNC: 7.4 G/DL (ref 6.4–8.3)
SODIUM SERPL-SCNC: 141 MMOL/L (ref 136–145)
TRIGL SERPL-MCNC: 101 MG/DL
TSH SERPL DL<=0.005 MIU/L-ACNC: 1.61 UIU/ML (ref 0.3–4.2)

## 2023-03-29 PROCEDURE — 36415 COLL VENOUS BLD VENIPUNCTURE: CPT

## 2023-03-29 PROCEDURE — 86803 HEPATITIS C AB TEST: CPT

## 2023-03-29 PROCEDURE — 87389 HIV-1 AG W/HIV-1&-2 AB AG IA: CPT

## 2023-03-29 PROCEDURE — 84443 ASSAY THYROID STIM HORMONE: CPT

## 2023-03-29 PROCEDURE — 83036 HEMOGLOBIN GLYCOSYLATED A1C: CPT

## 2023-03-29 PROCEDURE — 80061 LIPID PANEL: CPT

## 2023-03-29 PROCEDURE — 80053 COMPREHEN METABOLIC PANEL: CPT

## 2023-04-06 ENCOUNTER — HOSPITAL ENCOUNTER (OUTPATIENT)
Dept: CARDIOLOGY | Facility: CLINIC | Age: 63
Discharge: HOME | End: 2023-04-06
Attending: PHYSICIAN ASSISTANT | Admitting: PHYSICIAN ASSISTANT
Payer: COMMERCIAL

## 2023-04-06 DIAGNOSIS — R07.9 EXERTIONAL CHEST PAIN: ICD-10-CM

## 2023-04-06 PROCEDURE — 93017 CV STRESS TEST TRACING ONLY: CPT

## 2023-04-06 PROCEDURE — 93018 CV STRESS TEST I&R ONLY: CPT | Performed by: INTERNAL MEDICINE

## 2023-04-06 PROCEDURE — 93016 CV STRESS TEST SUPVJ ONLY: CPT | Performed by: INTERNAL MEDICINE

## 2023-04-13 ENCOUNTER — OFFICE VISIT (OUTPATIENT)
Dept: FAMILY MEDICINE | Facility: CLINIC | Age: 63
End: 2023-04-13
Payer: COMMERCIAL

## 2023-04-13 VITALS
OXYGEN SATURATION: 98 % | HEIGHT: 74 IN | BODY MASS INDEX: 24.38 KG/M2 | TEMPERATURE: 98.1 F | WEIGHT: 190 LBS | DIASTOLIC BLOOD PRESSURE: 78 MMHG | SYSTOLIC BLOOD PRESSURE: 142 MMHG | HEART RATE: 61 BPM

## 2023-04-13 DIAGNOSIS — R94.39 ABNORMAL STRESS TEST: Primary | ICD-10-CM

## 2023-04-13 DIAGNOSIS — E78.5 HYPERLIPIDEMIA LDL GOAL <70: ICD-10-CM

## 2023-04-13 DIAGNOSIS — I10 HYPERTENSIVE RESPONSE TO EXERCISE: ICD-10-CM

## 2023-04-13 LAB — NONINV COLON CA DNA+OCC BLD SCRN STL QL: NEGATIVE

## 2023-04-13 PROCEDURE — 99214 OFFICE O/P EST MOD 30 MIN: CPT | Performed by: FAMILY MEDICINE

## 2023-04-13 RX ORDER — NAPROXEN SODIUM 220 MG
220 TABLET ORAL 2 TIMES DAILY WITH MEALS
COMMUNITY
End: 2023-04-27

## 2023-04-13 RX ORDER — LISINOPRIL 10 MG/1
10 TABLET ORAL DAILY
Qty: 90 TABLET | Refills: 1 | Status: SHIPPED | OUTPATIENT
Start: 2023-04-13 | End: 2023-10-04

## 2023-04-13 RX ORDER — ATORVASTATIN CALCIUM 20 MG/1
20 TABLET, FILM COATED ORAL DAILY
Qty: 90 TABLET | Refills: 1 | Status: SHIPPED | OUTPATIENT
Start: 2023-04-13 | End: 2023-05-12
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
## 2023-04-13 NOTE — PATIENT INSTRUCTIONS
They should call you in the next 2 days to schedule the cardiology appointment   Please start the medications. Then start taking your blood pressure and bring them with you at your visit in 2-3 weeks .   If you have chest pain/pressure that is not going away in a few minutes please go to the emergency room

## 2023-04-24 ENCOUNTER — PREP FOR PROCEDURE (OUTPATIENT)
Dept: CARDIOLOGY | Facility: CLINIC | Age: 63
End: 2023-04-24

## 2023-04-24 ENCOUNTER — OFFICE VISIT (OUTPATIENT)
Dept: CARDIOLOGY | Facility: CLINIC | Age: 63
End: 2023-04-24
Attending: FAMILY MEDICINE
Payer: COMMERCIAL

## 2023-04-24 ENCOUNTER — DOCUMENTATION ONLY (OUTPATIENT)
Dept: CARDIOLOGY | Facility: CLINIC | Age: 63
End: 2023-04-24

## 2023-04-24 VITALS
WEIGHT: 187 LBS | BODY MASS INDEX: 24 KG/M2 | SYSTOLIC BLOOD PRESSURE: 138 MMHG | HEART RATE: 77 BPM | RESPIRATION RATE: 16 BRPM | HEIGHT: 74 IN | DIASTOLIC BLOOD PRESSURE: 74 MMHG

## 2023-04-24 DIAGNOSIS — R94.39 ABNORMAL STRESS TEST: Primary | ICD-10-CM

## 2023-04-24 DIAGNOSIS — I10 HYPERTENSIVE RESPONSE TO EXERCISE: ICD-10-CM

## 2023-04-24 DIAGNOSIS — R07.9 EXERTIONAL CHEST PAIN: ICD-10-CM

## 2023-04-24 PROCEDURE — 99204 OFFICE O/P NEW MOD 45 MIN: CPT | Performed by: INTERNAL MEDICINE

## 2023-04-24 RX ORDER — ASPIRIN 325 MG
325 TABLET ORAL ONCE
Status: CANCELLED | OUTPATIENT
Start: 2023-05-02 | End: 2023-04-24

## 2023-04-24 RX ORDER — SODIUM CHLORIDE 9 MG/ML
INJECTION, SOLUTION INTRAVENOUS CONTINUOUS
Status: CANCELLED | OUTPATIENT
Start: 2023-05-02

## 2023-04-24 RX ORDER — FENTANYL CITRATE 50 UG/ML
25 INJECTION, SOLUTION INTRAMUSCULAR; INTRAVENOUS
Status: CANCELLED | OUTPATIENT
Start: 2023-05-02

## 2023-04-24 RX ORDER — ASPIRIN 81 MG/1
243 TABLET, CHEWABLE ORAL ONCE
Status: CANCELLED | OUTPATIENT
Start: 2023-05-02

## 2023-04-24 RX ORDER — LIDOCAINE 40 MG/G
CREAM TOPICAL
Status: CANCELLED | OUTPATIENT
Start: 2023-04-24

## 2023-04-24 NOTE — H&P (VIEW-ONLY)
Thank you, Dr. Whipple, for asking [facility] to evaluate Mr. Noam Del Real.      Assessment/Recommendations   Assessment:    Chest /arm discomfort, exertional, possibly angina  Abnormal treadmill stress test with borderline ST segment changes and hypertensive response  Hypertension, improved control    Plan:  We discussed the results of the stress test.  High told him the results were inconclusive.  He deserves further evaluation.  We went over CT coronary angiogram and invasive coronary angiogram.  We discussed the pros and cons of each of those procedures.  He wants to go with invasive coronary angiogram and possible intervention.  No medication changes at this point.  He was advised to stop taking NSAIDs and start taking Tylenol for body aches in order to improve blood pressure control       History of Present Illness    Mr. Noam Del Real is a 62 year old male who comes in for cardiac evaluation.  He has been experiencing numbness and tingling in his arms that spread to his chest.  He has been noticing that with physical activities over the course of last several months.  He denies prolonged episode of chest pains at rest.  He was seen by his primary care provider for similar complaints.  He underwent treadmill stress test.  He developed hypertensive response to exercise and borderline ST segment depression.  He was started on lisinopril and aspirin after the stress test.  He has no history of documented coronary artery disease or valvular heart disease.  He has not had heart palpitations syncope.  His weight has been stable.  He has no PND and orthopnea.  He does have body aches and he takes Naprosyn twice a day.  He has been doing this for  number of years.      Stress Test: April 2026  Fransisco protocol 5 minutes 25 seconds no chest pain  1 mm ST segment depression  Peak blood pressure 238/82       Physical Examination Review of Systems   /74 (BP Location: Left arm, Patient  "Position: Sitting, Cuff Size: Adult Regular)   Pulse 77   Resp 16   Ht 1.88 m (6' 2\")   Wt 84.8 kg (187 lb)   BMI 24.01 kg/m    Body mass index is 24.01 kg/m .  Wt Readings from Last 3 Encounters:   04/24/23 84.8 kg (187 lb)   04/13/23 86.2 kg (190 lb)   03/24/23 83.5 kg (184 lb)     General Appearance:   Alert, cooperative, no distress, appears stated age   Head/ENT: Normocephalic, without obvious abnormality. Membranes moist      EYES:  no scleral icterus, normal conjunctivae   Neck: Supple, symmetrical, trachea midline, no adenopathy, thyroid: not enlarged, symmetric, no carotid bruit or JVD   Chest/Lungs:   Lungs are clear to auscultation, respirations unlabored. No tenderness or deformity    Cardiovascular:   Regular rhythm, S1, S2 normal, no murmur, rub or gallop.   Abdomen:  Soft, non-tender, bowel sounds active all four quadrants,  no masses, no organomegaly   Extremities: no cyanosis or clubbing. No edema   Skin: Skin color, texture, turgor normal, no rashes or lesions.    Psychiatric: Normal affect, calm   Neurologic: Alert and oriented x 3, moving all four extremities.     Enc Vitals  BP: 138/74  Pulse: 77  Resp: 16  Weight: 84.8 kg (187 lb) (With shoes.)  Height: 188 cm (6' 2\")                                          Lab Results    Chemistry/lipid CBC Cardiac Enzymes/BNP/TSH/INR   Recent Labs   Lab Test 03/29/23  0927   CHOL 214*   HDL 61   *   TRIG 101     Recent Labs   Lab Test 03/29/23  0927   *     Recent Labs   Lab Test 03/29/23  0927      POTASSIUM 4.4   CHLORIDE 101   CO2 29   *   BUN 18.1   CR 0.90   GFRESTIMATED >90   KRISTEL 9.8     Recent Labs   Lab Test 03/29/23  0927 04/24/20  1527   CR 0.90 0.81     Recent Labs   Lab Test 03/29/23  0927   A1C 5.2          Recent Labs   Lab Test 04/24/20  1527   WBC 6.6   HGB 15.8   HCT 44.9   MCV 86        Recent Labs   Lab Test 04/24/20  1527   HGB 15.8    No results for input(s): TROPONINI in the last 51603 hours.  No " results for input(s): BNP, NTBNPI, NTBNP in the last 05589 hours.  Recent Labs   Lab Test 03/29/23  0927   TSH 1.61     No results for input(s): INR in the last 08866 hours.     Medical History  Surgical History Family History Social History   Past Medical History:   Diagnosis Date     Malignant melanoma (H)      Past Surgical History:   Procedure Laterality Date     DENTAL SURGERY      [place]      ZZC ANESTH,LOWER ARM SURGERY  2002    Left     No premature CAD, SCD,cardiomyopathy   Social History     Socioeconomic History     Marital status:      Spouse name: Belkis Ledesma     Number of children: 0     Years of education: 12+     Highest education level: Not on file   Occupational History     Employer: OTHER   Tobacco Use     Smoking status: Never     Smokeless tobacco: Never   Vaping Use     Vaping status: Not on file   Substance and Sexual Activity     Alcohol use: Yes     Comment: Rarely     Drug use: No     Sexual activity: Yes     Partners: Female   Other Topics Concern     Parent/sibling w/ CABG, MI or angioplasty before 65F 55M? No   Social History Narrative     Not on file     Social Determinants of Health     Financial Resource Strain: Not on file   Food Insecurity: Not on file   Transportation Needs: Not on file   Physical Activity: Not on file   Stress: Not on file   Social Connections: Not on file   Intimate Partner Violence: Not on file   Housing Stability: Not on file         Medications  Allergies   Current Outpatient Medications   Medication Sig Dispense Refill     aspirin (ASA) 81 MG EC tablet Take 1 tablet (81 mg) by mouth daily 90 tablet 4     atorvastatin (LIPITOR) 20 MG tablet Take 1 tablet (20 mg) by mouth daily 90 tablet 1     levocetirizine (XYZAL) 5 MG tablet Take 1 tablet (5 mg) by mouth every evening 90 tablet 3     lisinopril (ZESTRIL) 10 MG tablet Take 1 tablet (10 mg) by mouth daily In the evening 90 tablet 1     naproxen sodium (ANAPROX) 220 MG tablet Take 220 mg by mouth  2 times daily (with meals)        No Known Allergies

## 2023-04-24 NOTE — LETTER
4/24/2023    Josiane Whipple MD  [address] [facility] [address]    RE: Noam Del Real       Dear Colleague,     I had the pleasure of seeing Noam Dle Real in the [facility].        Thank you, Dr. Whipple, for asking [facility] to evaluate Mr. Noam Del Real.      Assessment/Recommendations   Assessment:    Chest /arm discomfort, exertional, possibly angina  Abnormal treadmill stress test with borderline ST segment changes and hypertensive response  Hypertension, improved control    Plan:  We discussed the results of the stress test.  High told him the results were inconclusive.  He deserves further evaluation.  We went over CT coronary angiogram and invasive coronary angiogram.  We discussed the pros and cons of each of those procedures.  He wants to go with invasive coronary angiogram and possible intervention.  No medication changes at this point.  He was advised to stop taking NSAIDs and start taking Tylenol for body aches in order to improve blood pressure control       History of Present Illness    Mr. Noam Del Real is a 62 year old male who comes in for cardiac evaluation.  He has been experiencing numbness and tingling in his arms that spread to his chest.  He has been noticing that with physical activities over the course of last several months.  He denies prolonged episode of chest pains at rest.  He was seen by his primary care provider for similar complaints.  He underwent treadmill stress test.  He developed hypertensive response to exercise and borderline ST segment depression.  He was started on lisinopril and aspirin after the stress test.  He has no history of documented coronary artery disease or valvular heart disease.  He has not had heart palpitations syncope.  His weight has been stable.  He has no PND and orthopnea.  He does have body aches and he takes Naprosyn twice a day.  He has been doing this for  number of years.      Stress Test: April  "2026  Fransisco protocol 5 minutes 25 seconds no chest pain  1 mm ST segment depression  Peak blood pressure 238/82       Physical Examination Review of Systems   /74 (BP Location: Left arm, Patient Position: Sitting, Cuff Size: Adult Regular)   Pulse 77   Resp 16   Ht 1.88 m (6' 2\")   Wt 84.8 kg (187 lb)   BMI 24.01 kg/m    Body mass index is 24.01 kg/m .  Wt Readings from Last 3 Encounters:   04/24/23 84.8 kg (187 lb)   04/13/23 86.2 kg (190 lb)   03/24/23 83.5 kg (184 lb)     General Appearance:   Alert, cooperative, no distress, appears stated age   Head/ENT: Normocephalic, without obvious abnormality. Membranes moist      EYES:  no scleral icterus, normal conjunctivae   Neck: Supple, symmetrical, trachea midline, no adenopathy, thyroid: not enlarged, symmetric, no carotid bruit or JVD   Chest/Lungs:   Lungs are clear to auscultation, respirations unlabored. No tenderness or deformity    Cardiovascular:   Regular rhythm, S1, S2 normal, no murmur, rub or gallop.   Abdomen:  Soft, non-tender, bowel sounds active all four quadrants,  no masses, no organomegaly   Extremities: no cyanosis or clubbing. No edema   Skin: Skin color, texture, turgor normal, no rashes or lesions.    Psychiatric: Normal affect, calm   Neurologic: Alert and oriented x 3, moving all four extremities.     Enc Vitals  BP: 138/74  Pulse: 77  Resp: 16  Weight: 84.8 kg (187 lb) (With shoes.)  Height: 188 cm (6' 2\")                                          Lab Results    Chemistry/lipid CBC Cardiac Enzymes/BNP/TSH/INR   Recent Labs   Lab Test 03/29/23  0927   CHOL 214*   HDL 61   *   TRIG 101     Recent Labs   Lab Test 03/29/23 0927   *     Recent Labs   Lab Test 03/29/23 0927      POTASSIUM 4.4   CHLORIDE 101   CO2 29   *   BUN 18.1   CR 0.90   GFRESTIMATED >90   KRISTEL 9.8     Recent Labs   Lab Test 03/29/23 0927 04/24/20  1527   CR 0.90 0.81     Recent Labs   Lab Test 03/29/23 0927   A1C 5.2          Recent " Labs   Lab Test 04/24/20  1527   WBC 6.6   HGB 15.8   HCT 44.9   MCV 86        Recent Labs   Lab Test 04/24/20  1527   HGB 15.8    No results for input(s): TROPONINI in the last 69811 hours.  No results for input(s): BNP, NTBNPI, NTBNP in the last 68056 hours.  Recent Labs   Lab Test 03/29/23  0927   TSH 1.61     No results for input(s): INR in the last 59983 hours.     Medical History  Surgical History Family History Social History   Past Medical History:   Diagnosis Date    Malignant melanoma (H)      Past Surgical History:   Procedure Laterality Date    DENTAL SURGERY      [place]     ZZC ANESTH,LOWER ARM SURGERY  2002    Left     No premature CAD, SCD,cardiomyopathy   Social History     Socioeconomic History    Marital status:      Spouse name: Belkis Ledesma    Number of children: 0    Years of education: 12+    Highest education level: Not on file   Occupational History     Employer: OTHER   Tobacco Use    Smoking status: Never    Smokeless tobacco: Never   Vaping Use    Vaping status: Not on file   Substance and Sexual Activity    Alcohol use: Yes     Comment: Rarely    Drug use: No    Sexual activity: Yes     Partners: Female   Other Topics Concern    Parent/sibling w/ CABG, MI or angioplasty before 65F 55M? No   Social History Narrative    Not on file     Social Determinants of Health     Financial Resource Strain: Not on file   Food Insecurity: Not on file   Transportation Needs: Not on file   Physical Activity: Not on file   Stress: Not on file   Social Connections: Not on file   Intimate Partner Violence: Not on file   Housing Stability: Not on file         Medications  Allergies   Current Outpatient Medications   Medication Sig Dispense Refill    aspirin (ASA) 81 MG EC tablet Take 1 tablet (81 mg) by mouth daily 90 tablet 4    atorvastatin (LIPITOR) 20 MG tablet Take 1 tablet (20 mg) by mouth daily 90 tablet 1    levocetirizine (XYZAL) 5 MG tablet Take 1 tablet (5 mg) by mouth every  evening 90 tablet 3    lisinopril (ZESTRIL) 10 MG tablet Take 1 tablet (10 mg) by mouth daily In the evening 90 tablet 1    naproxen sodium (ANAPROX) 220 MG tablet Take 220 mg by mouth 2 times daily (with meals)        No Known Allergies                    Thank you for allowing me to participate in the care of your patient.      Sincerely,     Trever Mar MD     [facility]  cc:   Josiane Whipple [place]  [address] [place] [address]

## 2023-04-24 NOTE — PATIENT INSTRUCTIONS
Noam Del Real,    It was a pleasure to see you today at the [facility].     My recommendations after this visit include:    Coronary angiogram and possible intervention  Stop Naprosyn use Tylenol for body aches      CHAVA Mar MD, FACC, ELLE

## 2023-04-24 NOTE — PROGRESS NOTES
From: RooseveltKarey S  Sent: 4/24/2023  10:16 AM CDT  To: Amber Mcguire  Subject: WTZ Ordering In clinic teach                     Case type: CA w/ Poss PCI  Procedure Physician(s): KIKE/SEFERINO  Procedure Date and Patient Arrival Time: Tuesday 5/2, with arrival time of 0700  H&P: Completed on 4/24 w/ WTX  Pre-Procedure Lab Appt: Monday 5/1 at [facility]  Alerts/Important Info: Renal    Thank you,  Karey      Spouse is     Noam Del Real  [address] [facility] [id number] [phone] (home)     PCP:  Josiane Whipple  H&P completed by:  YESI 04/24  Admit date  05/02 Arrival time:  07:00 am  Anticoagulation:  NA  Previous PCI: No  Bypass Grafts: No  Renal Issues: Yes  Diabetic?: No  Device?: No  Type:    Ambulation status: indpendent    Reason for Visit:  Patient seen for pre-procedure education in preparation for: Coronary Angiogram with Possible PCI    Procedure Prep:  EKG results obtained, dated: To be completed on day of cath lab procedure  Hemogram results obtained: Completed on 05/01  Basic Metabolic Panel results obtained: Completed on 05/01  Pertinent cardiac test results: stress test    Patient Education    1. Your arrival time is 07:00 am.  [place] [address] - Main Entrance of the Hospital  2. Please plan on being at the hospital all day.  3. At any time, emergencies and/or urgent cases may come up which could delay the start of your procedure.    COVID Testing Instructions  *Mandatory COVID Testing:   ALL Patients will need to complete a COVID test no sooner than 4 days prior to their procedure (regardless of vaccination status).      To schedule COVID testing Please call [phone]    If you want to complete this at an outside facility please call them to find out if they will have the results within the appropriate time frame and their fax number.  You will need to provide us with that information so we can send the order.    The  facility completing the test will need to fax the results to [phone]    If you are running into and issues please call us.     Pre-procedure instructions  Take your temperature in the morning prior to coming in.  If your temperature is 100 F please call [facility] [phone] and notify them.  If you do not have access to a thermometer at home, please come in for testing.  If you are running a temperature your procedure may be rescheduled.  Patient instructed to not Eat or Drink after midnight.  Patient instructed to shower the evening before or the morning of the procedure.  Patient instructed to arrange for transportation home following procedure from a responsible family member or friend. No driving for at least 24 hours.  Patient instructed to have a responsible adult with them for 24 hours post-procedure.  Post-procedure follow up process.  Conscious sedation discussed.      Pre-procedure medication instructions.  Continue medications as scheduled, with a small amount of water on the day of the procedure unless indicated. (NO Diabetic Medications or Blood Thinners)  Pt instructed not to consume Alcohol, Tobacco, Caffeine, or Carbonated beverages 12 hours prior to procedure.  Patient instructed to take 324 mg of Aspirin the morning of procedure: Yes      Patient states understanding of procedure and agrees to proceed.    *PATIENTS RECORDS AVAILABLE IN [facility] UNLESS OTHERWISE INDICATED*      Patient Active Problem List   Diagnosis     Marijuana smoker       Current Outpatient Medications   Medication Sig Dispense Refill     aspirin (ASA) 81 MG EC tablet Take 1 tablet (81 mg) by mouth daily 90 tablet 4     atorvastatin (LIPITOR) 20 MG tablet Take 1 tablet (20 mg) by mouth daily 90 tablet 1     levocetirizine (XYZAL) 5 MG tablet Take 1 tablet (5 mg) by mouth every evening 90 tablet 3     lisinopril (ZESTRIL) 10 MG tablet Take 1 tablet (10 mg) by mouth daily In the evening 90 tablet 1     naproxen sodium  (ANAPROX) 220 MG tablet Take 220 mg by mouth 2 times daily (with meals)         No Known Allergies    Amber Mcgiure on 4/24/2023 at 11:14 AM

## 2023-04-27 ENCOUNTER — OFFICE VISIT (OUTPATIENT)
Dept: FAMILY MEDICINE | Facility: CLINIC | Age: 63
End: 2023-04-27
Attending: FAMILY MEDICINE
Payer: COMMERCIAL

## 2023-04-27 VITALS
HEART RATE: 62 BPM | HEIGHT: 74 IN | TEMPERATURE: 98.5 F | BODY MASS INDEX: 23.87 KG/M2 | OXYGEN SATURATION: 98 % | DIASTOLIC BLOOD PRESSURE: 74 MMHG | SYSTOLIC BLOOD PRESSURE: 139 MMHG | WEIGHT: 186 LBS

## 2023-04-27 DIAGNOSIS — I10 BENIGN ESSENTIAL HTN: Primary | ICD-10-CM

## 2023-04-27 PROCEDURE — 99212 OFFICE O/P EST SF 10 MIN: CPT | Performed by: FAMILY MEDICINE

## 2023-04-27 NOTE — PROGRESS NOTES
"  Assessment & Plan     Benign essential HTN  Improved. Will have labs for angio on Monday. Consider increase in med if numbers stay the same          CONSULTATION/REFERRAL to xcardiology as planned     Josiane Whipple MD  [facility] CAILIN Santacruz is a 62 year old, presenting for the following health issues:  Recheck Medication      HPI     Hypertension Follow-up    Do you check your blood pressure regularly outside of the clinic? Yes     Are you following a low salt diet? Yes    Are your blood pressures ever more than 140 on the top number (systolic) OR more   than 90 on the bottom number (diastolic), for example 140/90? Yes  121/78  120/73  138/60  142/78  145/75  140/87  145/81    Has been checking at home and it is better   He has no issues with the medications   Has angio on 5/2 he is nervous about what it will show       Review of Systems   Constitutional, HEENT, cardiovascular, pulmonary, gi and gu systems are negative, except as otherwise noted.      Objective    /74 (BP Location: Right arm, Patient Position: Sitting, Cuff Size: Adult Regular)   Pulse 62   Temp 98.5  F (36.9  C) (Tympanic)   Ht 1.88 m (6' 2\")   Wt 84.4 kg (186 lb)   SpO2 98%   BMI 23.88 kg/m    Body mass index is 23.88 kg/m .  Physical Exam   GENERAL: healthy, alert and no distress        Josiane Whipple M.D.              "

## 2023-04-30 LAB
ABO/RH(D): NORMAL
ANTIBODY SCREEN: NEGATIVE
SPECIMEN EXPIRATION DATE: NORMAL

## 2023-05-01 ENCOUNTER — LAB (OUTPATIENT)
Dept: CARDIOLOGY | Facility: CLINIC | Age: 63
End: 2023-05-01
Payer: COMMERCIAL

## 2023-05-01 DIAGNOSIS — R07.9 EXERTIONAL CHEST PAIN: ICD-10-CM

## 2023-05-01 DIAGNOSIS — R94.39 ABNORMAL STRESS TEST: ICD-10-CM

## 2023-05-01 DIAGNOSIS — I10 HYPERTENSIVE RESPONSE TO EXERCISE: ICD-10-CM

## 2023-05-01 LAB
ANION GAP SERPL CALCULATED.3IONS-SCNC: 10 MMOL/L (ref 7–15)
BUN SERPL-MCNC: 16.7 MG/DL (ref 8–23)
CALCIUM SERPL-MCNC: 9.6 MG/DL (ref 8.8–10.2)
CHLORIDE SERPL-SCNC: 102 MMOL/L (ref 98–107)
CREAT SERPL-MCNC: 0.87 MG/DL (ref 0.67–1.17)
DEPRECATED HCO3 PLAS-SCNC: 28 MMOL/L (ref 22–29)
ERYTHROCYTE [DISTWIDTH] IN BLOOD BY AUTOMATED COUNT: 11.9 % (ref 10–15)
GFR SERPL CREATININE-BSD FRML MDRD: >90 ML/MIN/1.73M2
GLUCOSE SERPL-MCNC: 119 MG/DL (ref 70–99)
HCT VFR BLD AUTO: 47.1 % (ref 40–53)
HGB BLD-MCNC: 15.8 G/DL (ref 13.3–17.7)
MCH RBC QN AUTO: 29.9 PG (ref 26.5–33)
MCHC RBC AUTO-ENTMCNC: 33.5 G/DL (ref 31.5–36.5)
MCV RBC AUTO: 89 FL (ref 78–100)
PLATELET # BLD AUTO: 254 10E3/UL (ref 150–450)
POTASSIUM SERPL-SCNC: 5.1 MMOL/L (ref 3.4–5.3)
RBC # BLD AUTO: 5.28 10E6/UL (ref 4.4–5.9)
SODIUM SERPL-SCNC: 140 MMOL/L (ref 136–145)
WBC # BLD AUTO: 5.5 10E3/UL (ref 4–11)

## 2023-05-01 PROCEDURE — 80048 BASIC METABOLIC PNL TOTAL CA: CPT

## 2023-05-01 PROCEDURE — 86901 BLOOD TYPING SEROLOGIC RH(D): CPT

## 2023-05-01 PROCEDURE — 86900 BLOOD TYPING SEROLOGIC ABO: CPT

## 2023-05-01 PROCEDURE — 85027 COMPLETE CBC AUTOMATED: CPT

## 2023-05-01 PROCEDURE — 86850 RBC ANTIBODY SCREEN: CPT

## 2023-05-01 PROCEDURE — 36415 COLL VENOUS BLD VENIPUNCTURE: CPT

## 2023-05-02 ENCOUNTER — HOSPITAL ENCOUNTER (OUTPATIENT)
Facility: HOSPITAL | Age: 63
Discharge: HOME | End: 2023-05-02
Attending: INTERNAL MEDICINE | Admitting: INTERNAL MEDICINE
Payer: COMMERCIAL

## 2023-05-02 VITALS
SYSTOLIC BLOOD PRESSURE: 117 MMHG | WEIGHT: 185 LBS | BODY MASS INDEX: 23 KG/M2 | OXYGEN SATURATION: 100 % | TEMPERATURE: 98.8 F | HEIGHT: 75 IN | DIASTOLIC BLOOD PRESSURE: 61 MMHG | RESPIRATION RATE: 17 BRPM | HEART RATE: 55 BPM

## 2023-05-02 DIAGNOSIS — R07.9 EXERTIONAL CHEST PAIN: ICD-10-CM

## 2023-05-02 DIAGNOSIS — I25.10 CORONARY ARTERY DISEASE INVOLVING NATIVE CORONARY ARTERY OF NATIVE HEART WITHOUT ANGINA PECTORIS: Primary | ICD-10-CM

## 2023-05-02 DIAGNOSIS — I10 HYPERTENSIVE RESPONSE TO EXERCISE: ICD-10-CM

## 2023-05-02 DIAGNOSIS — R94.39 ABNORMAL STRESS TEST: ICD-10-CM

## 2023-05-02 LAB
ACT BLD: 343 SECONDS (ref 74–150)
ATRIAL RATE - MUSE: 45 BPM
ATRIAL RATE - MUSE: 64 BPM
CHOLEST SERPL-MCNC: 132 MG/DL
DIASTOLIC BLOOD PRESSURE - MUSE: NORMAL MMHG
DIASTOLIC BLOOD PRESSURE - MUSE: NORMAL MMHG
HDLC SERPL-MCNC: 49 MG/DL
HOLD SPECIMEN: NORMAL
INTERPRETATION ECG - MUSE: NORMAL
INTERPRETATION ECG - MUSE: NORMAL
LDLC SERPL CALC-MCNC: 66 MG/DL
NONHDLC SERPL-MCNC: 83 MG/DL
P AXIS - MUSE: 24 DEGREES
P AXIS - MUSE: 69 DEGREES
PR INTERVAL - MUSE: 170 MS
PR INTERVAL - MUSE: 202 MS
QRS DURATION - MUSE: 100 MS
QRS DURATION - MUSE: 106 MS
QT - MUSE: 378 MS
QT - MUSE: 426 MS
QTC - MUSE: 368 MS
QTC - MUSE: 389 MS
R AXIS - MUSE: 29 DEGREES
R AXIS - MUSE: 59 DEGREES
SYSTOLIC BLOOD PRESSURE - MUSE: NORMAL MMHG
SYSTOLIC BLOOD PRESSURE - MUSE: NORMAL MMHG
T AXIS - MUSE: 40 DEGREES
T AXIS - MUSE: 48 DEGREES
TRIGL SERPL-MCNC: 84 MG/DL
VENTRICULAR RATE- MUSE: 45 BPM
VENTRICULAR RATE- MUSE: 64 BPM

## 2023-05-02 PROCEDURE — 250N000013 HC RX MED GY IP 250 OP 250 PS 637: Performed by: INTERNAL MEDICINE

## 2023-05-02 PROCEDURE — 99153 MOD SED SAME PHYS/QHP EA: CPT | Performed by: INTERNAL MEDICINE

## 2023-05-02 PROCEDURE — 250N000011 HC RX IP 250 OP 636: Performed by: INTERNAL MEDICINE

## 2023-05-02 PROCEDURE — 255N000002 HC RX 255 OP 636: Performed by: INTERNAL MEDICINE

## 2023-05-02 PROCEDURE — 92928 PRQ TCAT PLMT NTRAC ST 1 LES: CPT | Mod: LD | Performed by: INTERNAL MEDICINE

## 2023-05-02 PROCEDURE — C1894 INTRO/SHEATH, NON-LASER: HCPCS | Performed by: INTERNAL MEDICINE

## 2023-05-02 PROCEDURE — 93010 ELECTROCARDIOGRAM REPORT: CPT | Performed by: INTERNAL MEDICINE

## 2023-05-02 PROCEDURE — C9600 PERC DRUG-EL COR STENT SING: HCPCS | Performed by: INTERNAL MEDICINE

## 2023-05-02 PROCEDURE — 258N000003 HC RX IP 258 OP 636: Performed by: INTERNAL MEDICINE

## 2023-05-02 PROCEDURE — C1874 STENT, COATED/COV W/DEL SYS: HCPCS | Performed by: INTERNAL MEDICINE

## 2023-05-02 PROCEDURE — 250N000009 HC RX 250: Performed by: INTERNAL MEDICINE

## 2023-05-02 PROCEDURE — 99152 MOD SED SAME PHYS/QHP 5/>YRS: CPT | Performed by: INTERNAL MEDICINE

## 2023-05-02 PROCEDURE — 36415 COLL VENOUS BLD VENIPUNCTURE: CPT | Performed by: INTERNAL MEDICINE

## 2023-05-02 PROCEDURE — 93005 ELECTROCARDIOGRAM TRACING: CPT

## 2023-05-02 PROCEDURE — C1887 CATHETER, GUIDING: HCPCS | Performed by: INTERNAL MEDICINE

## 2023-05-02 PROCEDURE — 93458 L HRT ARTERY/VENTRICLE ANGIO: CPT | Mod: 26 | Performed by: INTERNAL MEDICINE

## 2023-05-02 PROCEDURE — 272N000001 HC OR GENERAL SUPPLY STERILE: Performed by: INTERNAL MEDICINE

## 2023-05-02 PROCEDURE — C1725 CATH, TRANSLUMIN NON-LASER: HCPCS | Performed by: INTERNAL MEDICINE

## 2023-05-02 PROCEDURE — 93458 L HRT ARTERY/VENTRICLE ANGIO: CPT | Mod: XU | Performed by: INTERNAL MEDICINE

## 2023-05-02 PROCEDURE — 85347 COAGULATION TIME ACTIVATED: CPT

## 2023-05-02 PROCEDURE — 80061 LIPID PANEL: CPT | Performed by: INTERNAL MEDICINE

## 2023-05-02 PROCEDURE — C1769 GUIDE WIRE: HCPCS | Performed by: INTERNAL MEDICINE

## 2023-05-02 PROCEDURE — 999N000054 HC STATISTIC EKG NON-CHARGEABLE

## 2023-05-02 PROCEDURE — 93010 ELECTROCARDIOGRAM REPORT: CPT | Mod: 76 | Performed by: INTERNAL MEDICINE

## 2023-05-02 PROCEDURE — 250N000013 HC RX MED GY IP 250 OP 250 PS 637: Performed by: NURSE PRACTITIONER

## 2023-05-02 DEVICE — STENT COR ONYX FRONTIER 26X3MM ONYXNG30026UX: Type: IMPLANTABLE DEVICE | Status: FUNCTIONAL

## 2023-05-02 RX ORDER — DIAZEPAM 10 MG
10 TABLET ORAL ONCE
Status: COMPLETED | OUTPATIENT
Start: 2023-05-02 | End: 2023-05-02

## 2023-05-02 RX ORDER — CLOPIDOGREL BISULFATE 75 MG/1
75 TABLET ORAL DAILY
Qty: 90 TABLET | Refills: 3 | Status: SHIPPED | OUTPATIENT
Start: 2023-05-03 | End: 2023-05-12

## 2023-05-02 RX ORDER — CLOPIDOGREL BISULFATE 75 MG/1
TABLET ORAL
Status: DISCONTINUED | OUTPATIENT
Start: 2023-05-02 | End: 2023-05-02 | Stop reason: HOSPADM

## 2023-05-02 RX ORDER — HEPARIN SODIUM 1000 [USP'U]/ML
INJECTION, SOLUTION INTRAVENOUS; SUBCUTANEOUS
Status: DISCONTINUED | OUTPATIENT
Start: 2023-05-02 | End: 2023-05-02 | Stop reason: HOSPADM

## 2023-05-02 RX ORDER — ASPIRIN 81 MG/1
81 TABLET ORAL DAILY
Status: DISCONTINUED | OUTPATIENT
Start: 2023-05-03 | End: 2023-05-02 | Stop reason: HOSPADM

## 2023-05-02 RX ORDER — SODIUM CHLORIDE 9 MG/ML
INJECTION, SOLUTION INTRAVENOUS CONTINUOUS
Status: DISCONTINUED | OUTPATIENT
Start: 2023-05-02 | End: 2023-05-02 | Stop reason: HOSPADM

## 2023-05-02 RX ORDER — ONDANSETRON 4 MG/1
4 TABLET, ORALLY DISINTEGRATING ORAL EVERY 6 HOURS PRN
Status: DISCONTINUED | OUTPATIENT
Start: 2023-05-02 | End: 2023-05-02 | Stop reason: HOSPADM

## 2023-05-02 RX ORDER — NALOXONE HYDROCHLORIDE 0.4 MG/ML
0.4 INJECTION, SOLUTION INTRAMUSCULAR; INTRAVENOUS; SUBCUTANEOUS
Status: DISCONTINUED | OUTPATIENT
Start: 2023-05-02 | End: 2023-05-02 | Stop reason: HOSPADM

## 2023-05-02 RX ORDER — OXYCODONE HYDROCHLORIDE 5 MG/1
5 TABLET ORAL EVERY 4 HOURS PRN
Status: DISCONTINUED | OUTPATIENT
Start: 2023-05-02 | End: 2023-05-02 | Stop reason: HOSPADM

## 2023-05-02 RX ORDER — LIDOCAINE 40 MG/G
CREAM TOPICAL
Status: DISCONTINUED | OUTPATIENT
Start: 2023-05-02 | End: 2023-05-02 | Stop reason: HOSPADM

## 2023-05-02 RX ORDER — FENTANYL CITRATE 50 UG/ML
25 INJECTION, SOLUTION INTRAMUSCULAR; INTRAVENOUS
Status: DISCONTINUED | OUTPATIENT
Start: 2023-05-02 | End: 2023-05-02 | Stop reason: HOSPADM

## 2023-05-02 RX ORDER — CLOPIDOGREL BISULFATE 75 MG/1
75 TABLET ORAL DAILY
Status: DISCONTINUED | OUTPATIENT
Start: 2023-05-03 | End: 2023-05-02 | Stop reason: HOSPADM

## 2023-05-02 RX ORDER — ACETAMINOPHEN 325 MG/1
650 TABLET ORAL EVERY 4 HOURS PRN
Status: DISCONTINUED | OUTPATIENT
Start: 2023-05-02 | End: 2023-05-02 | Stop reason: HOSPADM

## 2023-05-02 RX ORDER — ATROPINE SULFATE 0.1 MG/ML
0.5 INJECTION INTRAVENOUS
Status: DISCONTINUED | OUTPATIENT
Start: 2023-05-02 | End: 2023-05-02 | Stop reason: HOSPADM

## 2023-05-02 RX ORDER — METOPROLOL TARTRATE 1 MG/ML
5 INJECTION, SOLUTION INTRAVENOUS
Status: DISCONTINUED | OUTPATIENT
Start: 2023-05-02 | End: 2023-05-02 | Stop reason: HOSPADM

## 2023-05-02 RX ORDER — ASPIRIN 325 MG
325 TABLET ORAL ONCE
Status: COMPLETED | OUTPATIENT
Start: 2023-05-02 | End: 2023-05-02

## 2023-05-02 RX ORDER — NITROGLYCERIN 5 MG/ML
VIAL (ML) INTRAVENOUS
Status: DISCONTINUED | OUTPATIENT
Start: 2023-05-02 | End: 2023-05-02 | Stop reason: HOSPADM

## 2023-05-02 RX ORDER — NALOXONE HYDROCHLORIDE 0.4 MG/ML
0.2 INJECTION, SOLUTION INTRAMUSCULAR; INTRAVENOUS; SUBCUTANEOUS
Status: DISCONTINUED | OUTPATIENT
Start: 2023-05-02 | End: 2023-05-02 | Stop reason: HOSPADM

## 2023-05-02 RX ORDER — ONDANSETRON 2 MG/ML
4 INJECTION INTRAMUSCULAR; INTRAVENOUS EVERY 6 HOURS PRN
Status: DISCONTINUED | OUTPATIENT
Start: 2023-05-02 | End: 2023-05-02 | Stop reason: HOSPADM

## 2023-05-02 RX ORDER — FENTANYL CITRATE 50 UG/ML
INJECTION, SOLUTION INTRAMUSCULAR; INTRAVENOUS
Status: DISCONTINUED | OUTPATIENT
Start: 2023-05-02 | End: 2023-05-02 | Stop reason: HOSPADM

## 2023-05-02 RX ORDER — IODIXANOL 320 MG/ML
INJECTION, SOLUTION INTRAVASCULAR
Status: DISCONTINUED | OUTPATIENT
Start: 2023-05-02 | End: 2023-05-02 | Stop reason: HOSPADM

## 2023-05-02 RX ORDER — FLUMAZENIL 0.1 MG/ML
0.2 INJECTION, SOLUTION INTRAVENOUS
Status: DISCONTINUED | OUTPATIENT
Start: 2023-05-02 | End: 2023-05-02 | Stop reason: HOSPADM

## 2023-05-02 RX ORDER — NITROGLYCERIN 0.4 MG/1
0.4 TABLET SUBLINGUAL EVERY 5 MIN PRN
Status: DISCONTINUED | OUTPATIENT
Start: 2023-05-02 | End: 2023-05-02 | Stop reason: HOSPADM

## 2023-05-02 RX ORDER — OXYCODONE HYDROCHLORIDE 5 MG/1
10 TABLET ORAL EVERY 4 HOURS PRN
Status: DISCONTINUED | OUTPATIENT
Start: 2023-05-02 | End: 2023-05-02 | Stop reason: HOSPADM

## 2023-05-02 RX ORDER — ASPIRIN 81 MG/1
243 TABLET, CHEWABLE ORAL ONCE
Status: COMPLETED | OUTPATIENT
Start: 2023-05-02 | End: 2023-05-02

## 2023-05-02 RX ORDER — HYDRALAZINE HYDROCHLORIDE 20 MG/ML
10 INJECTION INTRAMUSCULAR; INTRAVENOUS EVERY 4 HOURS PRN
Status: DISCONTINUED | OUTPATIENT
Start: 2023-05-02 | End: 2023-05-02 | Stop reason: HOSPADM

## 2023-05-02 RX ADMIN — SODIUM CHLORIDE: 9 INJECTION, SOLUTION INTRAVENOUS at 08:16

## 2023-05-02 RX ADMIN — ASPIRIN 81 MG: 81 TABLET, CHEWABLE ORAL at 09:22

## 2023-05-02 RX ADMIN — DIAZEPAM 10 MG: 10 TABLET ORAL at 09:19

## 2023-05-02 ASSESSMENT — EJECTION FRACTION: EF_VALUE: .28

## 2023-05-02 ASSESSMENT — ACTIVITIES OF DAILY LIVING (ADL)
ADLS_ACUITY_SCORE: 35

## 2023-05-02 NOTE — DISCHARGE INSTRUCTIONS

## 2023-05-02 NOTE — Clinical Note
The first balloon was inserted into the left anterior descending and proximal left anterior descending.Max pressure = 12 jen. Total duration = 7 seconds.     Max pressure = 16 jen. Total duration = 11 seconds.    Balloon reinflated a second time: Max pressure = 16 jen. Total duration = 11 seconds.  Balloon reinflated a third time: Max pressure = 12 jen. Total duration = 7 seconds.

## 2023-05-02 NOTE — PRE-PROCEDURE
GENERAL PRE-PROCEDURE:   Procedure:  Coronary angiogram with possible PCI  Date/Time:  5/2/2023 9:25 AM    Written consent obtained?: Yes    Risks and benefits: Risks, benefits and alternatives were discussed    Consent given by:  Patient  Patient states understanding of procedure being performed: Yes    Patient's understanding of procedure matches consent: Yes    Procedure consent matches procedure scheduled: Yes    Expected level of sedation:  Moderate  Appropriately NPO:  Yes  ASA Class:  3 (abnormal stress test, chest pain, HTN)  Mallampati  :  Grade 3- soft palate visible, posterior pharyngeal wall not visible  Lungs:  Lungs clear with good breath sounds bilaterally  Heart:  Other (comment)  Heart exam comment:  Mahesh, regular  History & Physical reviewed:  History and physical reviewed and no updates needed  Statement of review:  I have reviewed the lab findings, diagnostic data, medications, and the plan for sedation

## 2023-05-02 NOTE — Clinical Note
Stent deployed in the proximal left anterior descending. Max pressure = 12 jen. Total duration = 27 seconds.

## 2023-05-02 NOTE — Clinical Note
The first balloon was inserted into the left anterior descending and proximal left anterior descending.Max pressure = 6 jen. Total duration = 19 seconds.

## 2023-05-10 ENCOUNTER — HOSPITAL ENCOUNTER (OUTPATIENT)
Dept: CARDIAC REHAB | Facility: CLINIC | Age: 63
Discharge: HOME | End: 2023-05-10
Attending: NURSE PRACTITIONER
Payer: COMMERCIAL

## 2023-05-10 DIAGNOSIS — I25.10 CORONARY ARTERY DISEASE INVOLVING NATIVE CORONARY ARTERY OF NATIVE HEART WITHOUT ANGINA PECTORIS: ICD-10-CM

## 2023-05-10 PROCEDURE — 93798 PHYS/QHP OP CAR RHAB W/ECG: CPT

## 2023-05-10 PROCEDURE — 93797 PHYS/QHP OP CAR RHAB WO ECG: CPT

## 2023-05-11 PROBLEM — I25.119 CORONARY ARTERY DISEASE INVOLVING NATIVE CORONARY ARTERY OF NATIVE HEART WITH ANGINA PECTORIS (H): Status: ACTIVE | Noted: 2023-05-11

## 2023-05-11 PROBLEM — I10 PRIMARY HYPERTENSION: Status: ACTIVE | Noted: 2023-05-11

## 2023-05-11 PROBLEM — E78.5 DYSLIPIDEMIA, GOAL LDL BELOW 70: Status: ACTIVE | Noted: 2023-05-11

## 2023-05-12 ENCOUNTER — OFFICE VISIT (OUTPATIENT)
Dept: CARDIOLOGY | Facility: CLINIC | Age: 63
End: 2023-05-12
Payer: COMMERCIAL

## 2023-05-12 VITALS — DIASTOLIC BLOOD PRESSURE: 80 MMHG | SYSTOLIC BLOOD PRESSURE: 132 MMHG

## 2023-05-12 DIAGNOSIS — I10 PRIMARY HYPERTENSION: ICD-10-CM

## 2023-05-12 DIAGNOSIS — R94.39 ABNORMAL CARDIOVASCULAR STRESS TEST: ICD-10-CM

## 2023-05-12 DIAGNOSIS — I25.119 CORONARY ARTERY DISEASE INVOLVING NATIVE CORONARY ARTERY OF NATIVE HEART WITH ANGINA PECTORIS (H): Primary | ICD-10-CM

## 2023-05-12 DIAGNOSIS — E78.5 DYSLIPIDEMIA, GOAL LDL BELOW 70: ICD-10-CM

## 2023-05-12 PROCEDURE — 99214 OFFICE O/P EST MOD 30 MIN: CPT | Performed by: NURSE PRACTITIONER

## 2023-05-12 RX ORDER — CLOPIDOGREL BISULFATE 75 MG/1
75 TABLET ORAL DAILY
Qty: 90 TABLET | Refills: 3 | Status: SHIPPED | OUTPATIENT
Start: 2023-05-12

## 2023-05-12 RX ORDER — ATORVASTATIN CALCIUM 20 MG/1
20 TABLET, FILM COATED ORAL DAILY
Qty: 90 TABLET | Refills: 3 | Status: SHIPPED | OUTPATIENT
Start: 2023-05-12

## 2023-05-12 NOTE — PROGRESS NOTES
Assessment/Recommendations   Assessment:    1.  Coronary artery disease: Patient saw Dr. Mar on 4/24/2023 for chest/arm discomfort with exertion suspected angina.  He had an abnormal stress test with borderline ST segment changes and hypertensive response with systolic blood pressure in 200s.    Patient underwent coronary angiogram on 5/2/2023 showed 90% stenosis in proximal LAD which was successfully treated with a drug-eluting stent x1.  No significant disease noted in left circumflex and RCA.    On dual antiplatelet therapy with ASA 81 mg indefinitely and Clopidogrel (Plavix) 75 mg daily/ Prasugrel (Effient) 10 mg daily for 12 months.    Patient denies further chest or arm discomfort.  He feels better overall since recent stent placement.    Cardiac rehab has been initiated.    2.  Dyslipidemia with LDL goal <70/Obesity with a BMI of: Noam Del Real is on moderate intensity statin therapy with Atorvastatin 20 mg daily . Most recent LDL is 66 on 5/2/23.     3.  Hypertension: His blood pressure is 132/80.  Blood pressure and cardiac rehab looks under excellent control.    Plan:  - We discussed the importance of antiplatelet therapy and talking with his cardiologist prior to stopping these medications for any reason.      - Encouraged to seek medical attention if recurrent chest pain or shortness of breath.    - Risk factor modification and lifestyle management topics were discussed including managing comorbidities, heart healthy diet, exercise, stress reduction and alcohol use.      - Cardiac rehab as scheduled    - We discussed a diet low in saturated fat, weight loss, and exercise along with medication for better control of cholesterol.      -Instructed to avoid NSAIDs given bleeding risk on dual antiplatelet therapy and cardiovascular risks effect    - Continue current hypertension regimen    Follow up with Dr. Mar in 4 to 6 weeks     History of Present Illness/Subjective    Mr. Noam Del Real  is a 62 year old male with a past medical history of hypertension, abnormal treadmill stress test, and chest and arm discomfort with experiencing numbness and tingling in arms spreading to his chest and recent diagnosis of coronary disease with status post PCI to LAD who is seen at [facility] for post coronary intervention follow up.     Today, Noam denies further chest or arm discomfort since recent stent placement.  He overall feels much better since recent stent placement.  He denies fatigue, lightheadedness, shortness of breath, dyspnea on exertion, orthopnea, PND, palpitations, chest pain, abdominal fullness/bloating and lower extremity edema or any bleeding complications.  He follows heart healthy diet.  He is in cardiac rehab and tolerating well.  He no longer smokes marijuana.  He has stopped taking NSAIDs and switch to Tylenol.  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    Coronary Angiogram from 5/2/23: reviewed:  Conclusion      Prox LAD lesion is 90% stenosed.     1.  High-grade proximal LAD stenosis  2.  No significant disease in the left circumflex or right coronary artery.  3.  Successful PCI proximal LAD with drug-eluting stent implant x1         Recommendations    Medications:  - Continue dual antiplatelet therapy for 12 month(s).        Physical Examination Review of Systems   /80   There is no height or weight on file to calculate BMI.  Wt Readings from Last 3 Encounters:   05/02/23 83.9 kg (185 lb)   04/27/23 84.4 kg (186 lb)   04/24/23 84.8 kg (187 lb)     General Appearance:   no distress, normal body habitus   ENT/Mouth: membranes moist, no oral lesions or bleeding gums.      EYES:  no scleral icterus, normal conjunctivae   Neck: no carotid bruits or thyromegaly   Chest/Lungs:   lungs are clear to auscultation, no rales or wheezing, equal chest wall expansion    Cardiovascular:   Heart rate regular. Normal first  and second heart sounds with no murmurs, rubs, or gallops; the carotid, radial and posterior tibial pulses are intact, Jugular venous pressure flat with no edema bilaterally    Abdomen:  no organomegaly, masses, bruits, or tenderness; bowel sounds are present   Extremities  Puncture Site: no cyanosis or clubbing  Right radial site is soft with no  bruising.  Radial pulses and Pedal pulses intact and symmetrical.  CMS intact.   Skin: no xanthelasma, warm.    Neurologic: normal  bilateral, no tremors     Psychiatric: alert and oriented x3, calm                Negative unless noted in HPI     Medical History  Surgical History Family History Social History   Past Medical History:   Diagnosis Date     Malignant melanoma (H)     Past Surgical History:   Procedure Laterality Date     CV CORONARY ANGIOGRAM N/A 5/2/2023    Procedure: Coronary Angiogram;  Surgeon: Peterson Fernandez MD;  Location: [facility] CV     CV LEFT HEART CATH N/A 5/2/2023    Procedure: Left Heart Catheterization;  Surgeon: Peterson Fernandez MD;  Location: [facility] CV     CV PCI N/A 5/2/2023    Procedure: Percutaneous Coronary Intervention;  Surgeon: Peterson Fernandez MD;  Location: [facility] CATH LAB CV     DENTAL SURGERY      [place]      ZZC ANESTH,LOWER ARM SURGERY  2002    Left    Family History   Problem Relation Age of Onset     Prostate Cancer Father         Removal of prostate for elevated PSA. Unsure of cancer.     Social History     Socioeconomic History     Marital status:      Spouse name: Belkis Ledesma     Number of children: 0     Years of education: 12+     Highest education level: Not on file   Occupational History     Employer: OTHER   Tobacco Use     Smoking status: Never     Smokeless tobacco: Never   Vaping Use     Vaping status: Not on file   Substance and Sexual Activity     Alcohol use: Yes     Comment: Rarely     Drug use: No     Sexual activity: Yes     Partners: Female   Other Topics Concern      Parent/sibling w/ CABG, MI or angioplasty before 65F 55M? No   Social History Narrative     Not on file     Social Determinants of Health     Financial Resource Strain: Not on file   Food Insecurity: Not on file   Transportation Needs: Not on file   Physical Activity: Not on file   Stress: Not on file   Social Connections: Not on file   Intimate Partner Violence: Not on file   Housing Stability: Not on file          Medications  Allergies   Current Outpatient Medications   Medication Sig Dispense Refill     aspirin (ASA) 81 MG EC tablet Take 1 tablet (81 mg) by mouth daily 90 tablet 4     atorvastatin (LIPITOR) 20 MG tablet Take 1 tablet (20 mg) by mouth daily 90 tablet 3     clopidogrel (PLAVIX) 75 MG tablet Take 1 tablet (75 mg) by mouth daily 90 tablet 3     levocetirizine (XYZAL) 5 MG tablet Take 1 tablet (5 mg) by mouth every evening 90 tablet 3     lisinopril (ZESTRIL) 10 MG tablet Take 1 tablet (10 mg) by mouth daily In the evening 90 tablet 1    No Known Allergies      Lab Results    Chemistry/lipid CBC Cardiac Enzymes/BNP/TSH/INR   Lab Results   Component Value Date    CHOL 132 05/02/2023    HDL 49 05/02/2023    TRIG 84 05/02/2023    BUN 16.7 05/01/2023     05/01/2023    CO2 28 05/01/2023    Lab Results   Component Value Date    WBC 5.5 05/01/2023    HGB 15.8 05/01/2023    HCT 47.1 05/01/2023    MCV 89 05/01/2023     05/01/2023    Lab Results   Component Value Date    TSH 1.61 03/29/2023        35  minutes spent on the date of encounter doing chart review, review of test results, interpretation with above tests, patient visit and documentation.        This note has been dictated using voice recognition software. Any grammatical, typographical, or context distortions are unintentional and inherent to the software

## 2023-05-12 NOTE — PATIENT INSTRUCTIONS
Noam Del Real,    It was a pleasure to see you today at the [facility].     My recommendations after this visit include:    - No medications changes made today    - Please seek medical attention if you develop recurrent chest pain, shortness of breath or similar symptoms you experienced prior to recent cardiac event    -Continue to avoid nonsteroidal anti-inflammatory drugs such as ibuprofen/Advil, Aleve/naproxen because of the bleeding risk and adverse effect on cardiovascular health    - Cardiac rehab as scheduled    - Follow up with Dr. Corral in 4-6 weeks    - Please call [phone], if you have any questions or concerns    Deann Galdamez CNP    Medication     Take all your medications as prescribed  Do not stop any medications without talking with a healthcare provider    Exercise      Physical activity is important for overall health  Set a goal of 150 minutes of exercise each week  For example, 30 minutes of exercise 5 days each week.    These 30 minutes can be broken into shorter periods of 15 minutes twice daily or 10 minutes three times daily  Start any exercise program slowly and work towards the goal of 150 minutes each week  For example, you may start with 10 minutes and plan to add a few minutes each week as you get stronger   Examples of exercise include walking, swimming, or biking  Remember to stretch and stay hydrated with exercise    Diet     A heart healthy diet includes:  A variety of fruits and vegetables  Whole grains  Low-fat dairy (fat-free, 1% fat, and low-fat)  Lean meats and poultry without skin   Fish (eat fish 2 times each week)  Nuts  Limit saturated fat to about 13 grams each day (based on a 2000 calorie diet)  Limit red meat  Limit sugars (sweets and sugary beverages)  Limit your portion sizes  Do not add salt to your food when cooking or at the table  Limit alcohol intake (no more than 1 drink each day for women or 2 drinks each day for men)    Weight  Loss     Work on losing weight with diet and exercise  You BMI (body mass index) should be between 18.5-24.9  This is a calculation of your weight and height  Please ask your healthcare provider for your BMI    Manage Other Chronic Health Conditions     Control cholesterol  Eat a diet low in saturated fat  Exercise   Take a statin medication as prescribed  Manage blood pressure  Eat a diet low in sodium  Exercise  Reduce stress  Lose weight   Take blood pressure medications as prescribed  Control blood sugars if diabetic  Monitor sugars and carbohydrates in your diet  Lose weight   Take diabetes medications as prescribed  Follow-up with your primary care provider to make sure your blood sugars are well controlled    Stress Reduction     Find time each day to relax  Reading, listening to music, yoga, meditation, exercise, spending time with friends and family, volunteering   Get 6-8 hours of sleep each night    Smoking Cessation     Smoking causes numerous health problems including coronary artery disease  It is never too late to quit  Set realistic goals for quitting  Decrease the number of cigarettes used each week  Use nicotine gum or patches to help you quit    Information from the American Heart Association.  Please visit their website at www.heart.org

## 2023-05-12 NOTE — LETTER
5/12/2023    Josiane Whipple [place]  [id number] [address] [facility] [id number]    RE: Noam Del Real       Dear Colleague,     I had the pleasure of seeing Noam Del Real in the [facility].          Assessment/Recommendations   Assessment:    1.  Coronary artery disease: Patient saw Dr. Mar on 4/24/2023 for chest/arm discomfort with exertion suspected angina.  He had an abnormal stress test with borderline ST segment changes and hypertensive response with systolic blood pressure in 200s.    Patient underwent coronary angiogram on 5/2/2023 showed 90% stenosis in proximal LAD which was successfully treated with a drug-eluting stent x1.  No significant disease noted in left circumflex and RCA.    On dual antiplatelet therapy with ASA 81 mg indefinitely and Clopidogrel (Plavix) 75 mg daily/ Prasugrel (Effient) 10 mg daily for 12 months.    Patient denies further chest or arm discomfort.  He feels better overall since recent stent placement.    Cardiac rehab has been initiated.    2.  Dyslipidemia with LDL goal <70/Obesity with a BMI of: Noam Del Real is on moderate intensity statin therapy with Atorvastatin 20 mg daily . Most recent LDL is 66 on 5/2/23.     3.  Hypertension: His blood pressure is 132/80.  Blood pressure and cardiac rehab looks under excellent control.    Plan:  - We discussed the importance of antiplatelet therapy and talking with his cardiologist prior to stopping these medications for any reason.      - Encouraged to seek medical attention if recurrent chest pain or shortness of breath.    - Risk factor modification and lifestyle management topics were discussed including managing comorbidities, heart healthy diet, exercise, stress reduction and alcohol use.      - Cardiac rehab as scheduled    - We discussed a diet low in saturated fat, weight loss, and exercise along with medication for better control of cholesterol.      -Instructed to avoid NSAIDs given bleeding risk on dual  antiplatelet therapy and cardiovascular risks effect    - Continue current hypertension regimen    Follow up with Dr. Mar in 4 to 6 weeks     History of Present Illness/Subjective    MrXena Del Real is a 62 year old male with a past medical history of hypertension, abnormal treadmill stress test, and chest and arm discomfort with experiencing numbness and tingling in arms spreading to his chest and recent diagnosis of coronary disease with status post PCI to LAD who is seen at [facility] for post coronary intervention follow up.     Today, Noam denies further chest or arm discomfort since recent stent placement.  He overall feels much better since recent stent placement.  He denies fatigue, lightheadedness, shortness of breath, dyspnea on exertion, orthopnea, PND, palpitations, chest pain, abdominal fullness/bloating and lower extremity edema or any bleeding complications.  He follows heart healthy diet.  He is in cardiac rehab and tolerating well.  He no longer smokes marijuana.  He has stopped taking NSAIDs and switch to Tylenol.  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    Coronary Angiogram from 5/2/23: reviewed:  Conclusion     Prox LAD lesion is 90% stenosed.     1.  High-grade proximal LAD stenosis  2.  No significant disease in the left circumflex or right coronary artery.  3.  Successful PCI proximal LAD with drug-eluting stent implant x1         Recommendations    Medications:  - Continue dual antiplatelet therapy for 12 month(s).        Physical Examination Review of Systems   /80   There is no height or weight on file to calculate BMI.  Wt Readings from Last 3 Encounters:   05/02/23 83.9 kg (185 lb)   04/27/23 84.4 kg (186 lb)   04/24/23 84.8 kg (187 lb)     General Appearance:   no distress, normal body habitus   ENT/Mouth: membranes moist, no oral lesions or bleeding gums.      EYES:  no scleral icterus, normal  conjunctivae   Neck: no carotid bruits or thyromegaly   Chest/Lungs:   lungs are clear to auscultation, no rales or wheezing, equal chest wall expansion    Cardiovascular:   Heart rate regular. Normal first and second heart sounds with no murmurs, rubs, or gallops; the carotid, radial and posterior tibial pulses are intact, Jugular venous pressure flat with no edema bilaterally    Abdomen:  no organomegaly, masses, bruits, or tenderness; bowel sounds are present   Extremities  Puncture Site: no cyanosis or clubbing  Right radial site is soft with no  bruising.  Radial pulses and Pedal pulses intact and symmetrical.  CMS intact.   Skin: no xanthelasma, warm.    Neurologic: normal  bilateral, no tremors     Psychiatric: alert and oriented x3, calm                Negative unless noted in HPI     Medical History  Surgical History Family History Social History   Past Medical History:   Diagnosis Date    Malignant melanoma (H)     Past Surgical History:   Procedure Laterality Date    CV CORONARY ANGIOGRAM N/A 5/2/2023    Procedure: Coronary Angiogram;  Surgeon: Peterson Fernandez MD;  Location: [facility]    CV LEFT HEART CATH N/A 5/2/2023    Procedure: Left Heart Catheterization;  Surgeon: Peterson Fernandez MD;  Location: [facility]    CV PCI N/A 5/2/2023    Procedure: Percutaneous Coronary Intervention;  Surgeon: Peterson Fernandez MD;  Location: [facility] CV    DENTAL SURGERY      [place]     ZZC ANESTH,LOWER ARM SURGERY  2002    Left    Family History   Problem Relation Age of Onset    Prostate Cancer Father         Removal of prostate for elevated PSA. Unsure of cancer.     Social History     Socioeconomic History    Marital status:      Spouse name: eBlkis Ledesma    Number of children: 0    Years of education: 12+    Highest education level: Not on file   Occupational History     Employer: OTHER   Tobacco Use    Smoking status: Never    Smokeless tobacco: Never   Vaping Use     Vaping status: Not on file   Substance and Sexual Activity    Alcohol use: Yes     Comment: Rarely    Drug use: No    Sexual activity: Yes     Partners: Female   Other Topics Concern    Parent/sibling w/ CABG, MI or angioplasty before 65F 55M? No   Social History Narrative    Not on file     Social Determinants of Health     Financial Resource Strain: Not on file   Food Insecurity: Not on file   Transportation Needs: Not on file   Physical Activity: Not on file   Stress: Not on file   Social Connections: Not on file   Intimate Partner Violence: Not on file   Housing Stability: Not on file          Medications  Allergies   Current Outpatient Medications   Medication Sig Dispense Refill    aspirin (ASA) 81 MG EC tablet Take 1 tablet (81 mg) by mouth daily 90 tablet 4    atorvastatin (LIPITOR) 20 MG tablet Take 1 tablet (20 mg) by mouth daily 90 tablet 3    clopidogrel (PLAVIX) 75 MG tablet Take 1 tablet (75 mg) by mouth daily 90 tablet 3    levocetirizine (XYZAL) 5 MG tablet Take 1 tablet (5 mg) by mouth every evening 90 tablet 3    lisinopril (ZESTRIL) 10 MG tablet Take 1 tablet (10 mg) by mouth daily In the evening 90 tablet 1    No Known Allergies      Lab Results    Chemistry/lipid CBC Cardiac Enzymes/BNP/TSH/INR   Lab Results   Component Value Date    CHOL 132 05/02/2023    HDL 49 05/02/2023    TRIG 84 05/02/2023    BUN 16.7 05/01/2023     05/01/2023    CO2 28 05/01/2023    Lab Results   Component Value Date    WBC 5.5 05/01/2023    HGB 15.8 05/01/2023    HCT 47.1 05/01/2023    MCV 89 05/01/2023     05/01/2023    Lab Results   Component Value Date    TSH 1.61 03/29/2023        35  minutes spent on the date of encounter doing chart review, review of test results, interpretation with above tests, patient visit and documentation.        This note has been dictated using voice recognition software. Any grammatical, typographical, or context distortions are unintentional and inherent to the  software          Thank you for allowing me to participate in the care of your patient.      Sincerely,     SERGIO [facility]  cc:   No referring provider defined for this encounter.

## 2023-05-15 ENCOUNTER — HOSPITAL ENCOUNTER (OUTPATIENT)
Dept: CARDIAC REHAB | Facility: CLINIC | Age: 63
Discharge: HOME | End: 2023-05-15
Attending: FAMILY MEDICINE
Payer: COMMERCIAL

## 2023-05-15 PROCEDURE — 93798 PHYS/QHP OP CAR RHAB W/ECG: CPT

## 2023-05-17 ENCOUNTER — HOSPITAL ENCOUNTER (OUTPATIENT)
Dept: CARDIAC REHAB | Facility: CLINIC | Age: 63
Discharge: HOME | End: 2023-05-17
Attending: FAMILY MEDICINE
Payer: COMMERCIAL

## 2023-05-17 PROCEDURE — 93798 PHYS/QHP OP CAR RHAB W/ECG: CPT

## 2023-05-22 ENCOUNTER — HOSPITAL ENCOUNTER (OUTPATIENT)
Dept: CARDIAC REHAB | Facility: CLINIC | Age: 63
Discharge: HOME | End: 2023-05-22
Attending: FAMILY MEDICINE
Payer: COMMERCIAL

## 2023-05-22 PROCEDURE — 93798 PHYS/QHP OP CAR RHAB W/ECG: CPT

## 2023-05-22 PROCEDURE — 93797 PHYS/QHP OP CAR RHAB WO ECG: CPT

## 2023-05-24 ENCOUNTER — HOSPITAL ENCOUNTER (OUTPATIENT)
Dept: CARDIAC REHAB | Facility: CLINIC | Age: 63
Discharge: HOME | End: 2023-05-24
Attending: FAMILY MEDICINE
Payer: COMMERCIAL

## 2023-05-24 PROCEDURE — 93798 PHYS/QHP OP CAR RHAB W/ECG: CPT

## 2023-05-31 ENCOUNTER — HOSPITAL ENCOUNTER (OUTPATIENT)
Dept: CARDIAC REHAB | Facility: CLINIC | Age: 63
Discharge: HOME | End: 2023-05-31
Attending: FAMILY MEDICINE
Payer: COMMERCIAL

## 2023-05-31 PROCEDURE — 93798 PHYS/QHP OP CAR RHAB W/ECG: CPT

## 2023-06-05 ENCOUNTER — HOSPITAL ENCOUNTER (OUTPATIENT)
Dept: CARDIAC REHAB | Facility: CLINIC | Age: 63
Discharge: HOME | End: 2023-06-05
Attending: FAMILY MEDICINE
Payer: COMMERCIAL

## 2023-06-05 PROCEDURE — 93798 PHYS/QHP OP CAR RHAB W/ECG: CPT

## 2023-06-07 ENCOUNTER — HOSPITAL ENCOUNTER (OUTPATIENT)
Dept: CARDIAC REHAB | Facility: CLINIC | Age: 63
Discharge: HOME | End: 2023-06-07
Attending: FAMILY MEDICINE
Payer: COMMERCIAL

## 2023-06-07 PROCEDURE — 93798 PHYS/QHP OP CAR RHAB W/ECG: CPT

## 2023-06-12 ENCOUNTER — HOSPITAL ENCOUNTER (OUTPATIENT)
Dept: CARDIAC REHAB | Facility: CLINIC | Age: 63
Discharge: HOME | End: 2023-06-12
Attending: FAMILY MEDICINE
Payer: COMMERCIAL

## 2023-06-12 PROCEDURE — 93798 PHYS/QHP OP CAR RHAB W/ECG: CPT

## 2023-06-14 ENCOUNTER — HOSPITAL ENCOUNTER (OUTPATIENT)
Dept: CARDIAC REHAB | Facility: CLINIC | Age: 63
Discharge: HOME | End: 2023-06-14
Attending: FAMILY MEDICINE
Payer: COMMERCIAL

## 2023-06-14 PROCEDURE — 93798 PHYS/QHP OP CAR RHAB W/ECG: CPT

## 2023-06-19 ENCOUNTER — HOSPITAL ENCOUNTER (OUTPATIENT)
Dept: CARDIAC REHAB | Facility: CLINIC | Age: 63
Discharge: HOME | End: 2023-06-19
Attending: FAMILY MEDICINE
Payer: COMMERCIAL

## 2023-06-19 PROCEDURE — 93798 PHYS/QHP OP CAR RHAB W/ECG: CPT

## 2023-06-23 ENCOUNTER — HOSPITAL ENCOUNTER (OUTPATIENT)
Dept: CARDIAC REHAB | Facility: CLINIC | Age: 63
Discharge: HOME | End: 2023-06-23
Attending: NURSE PRACTITIONER
Payer: COMMERCIAL

## 2023-06-23 PROCEDURE — 93798 PHYS/QHP OP CAR RHAB W/ECG: CPT

## 2023-06-26 ENCOUNTER — OFFICE VISIT (OUTPATIENT)
Dept: CARDIOLOGY | Facility: CLINIC | Age: 63
End: 2023-06-26
Payer: COMMERCIAL

## 2023-06-26 ENCOUNTER — HOSPITAL ENCOUNTER (OUTPATIENT)
Dept: CARDIAC REHAB | Facility: CLINIC | Age: 63
Discharge: HOME | End: 2023-06-26
Attending: FAMILY MEDICINE
Payer: COMMERCIAL

## 2023-06-26 VITALS
SYSTOLIC BLOOD PRESSURE: 157 MMHG | WEIGHT: 184 LBS | HEART RATE: 66 BPM | RESPIRATION RATE: 16 BRPM | DIASTOLIC BLOOD PRESSURE: 87 MMHG | BODY MASS INDEX: 22.88 KG/M2 | HEIGHT: 75 IN

## 2023-06-26 DIAGNOSIS — I25.119 CORONARY ARTERY DISEASE INVOLVING NATIVE CORONARY ARTERY OF NATIVE HEART WITH ANGINA PECTORIS (H): Primary | ICD-10-CM

## 2023-06-26 PROCEDURE — 93798 PHYS/QHP OP CAR RHAB W/ECG: CPT

## 2023-06-26 PROCEDURE — 99214 OFFICE O/P EST MOD 30 MIN: CPT | Performed by: INTERNAL MEDICINE

## 2023-06-26 RX ORDER — ACETAMINOPHEN 500 MG
500-1000 TABLET ORAL DAILY
COMMUNITY

## 2023-06-26 RX ORDER — ATORVASTATIN CALCIUM 40 MG/1
40 TABLET, FILM COATED ORAL DAILY
Qty: 90 TABLET | Refills: 3 | Status: SHIPPED | OUTPATIENT
Start: 2023-06-26

## 2023-06-26 NOTE — PROGRESS NOTES
"    Cardiology Progress Note     Assessment:  Coronary artery disease, history of new onset angina status post proximal LAD stenting in May 2023, no angina  Hypercholesterolemia on low-dose atorvastatin, suboptimal control  Hypertension, good control, whitecoat phenomenon    Plan:  Continue current antiplatelet medications.  Plavix should be continued for at least 6 months and preferred to 12 months.   He has had normal blood pressure readings when he comes for cardiac rehab.  No need to change blood pressure medications.    LDL cholesterol suboptimal.  We will increase dose of atorvastatin to 40 mg a day and lipid lipids in 2 months      Follow-up in May 2024    Subjective:   This is 62 year old male who comes in today for follow-up visit.  I saw him earlier this year because of new onset of chest and arm discomfort.  He underwent coronary angiogram and stenting of proximal LAD.  He has had complete resolution of chest discomfort.  He has been attending cardiac rehab program.  He has not had any new symptoms.  He tolerates medication well.    Review of Systems:   Negative other than history of present illness    Objective:   BP (!) 157/87 (BP Location: Right arm, Patient Position: Sitting, Cuff Size: Adult Large)   Pulse 66   Resp 16   Ht 1.905 m (6' 3\")   Wt 83.5 kg (184 lb)   BMI 23.00 kg/m    Physical Exam:  GENERAL: no distress  NECK: No JVD  LUNGS: Clear to auscultation.  CARDIAC: regular rhythm, S1 & S2 normal.  No heaves, thrills, gallops or murmurs.  ABDOMEN: flat, negative hepatosplenomegaly, soft and non-tender.  EXTREMITIES: No evidence of cyanosis, clubbing or edema.    Current Outpatient Medications   Medication Sig Dispense Refill     acetaminophen (TYLENOL) 500 MG tablet Take 500-1,000 mg by mouth daily       aspirin (ASA) 81 MG EC tablet Take 1 tablet (81 mg) by mouth daily 90 tablet 4     atorvastatin (LIPITOR) 20 MG tablet Take 1 tablet (20 mg) by mouth daily 90 tablet 3     atorvastatin " (LIPITOR) 40 MG tablet Take 1 tablet (40 mg) by mouth daily 90 tablet 3     clopidogrel (PLAVIX) 75 MG tablet Take 1 tablet (75 mg) by mouth daily 90 tablet 3     levocetirizine (XYZAL) 5 MG tablet Take 1 tablet (5 mg) by mouth every evening 90 tablet 3     lisinopril (ZESTRIL) 10 MG tablet Take 1 tablet (10 mg) by mouth daily In the evening 90 tablet 1       Cardiographics:    Stress Test: April 2026  Fransisco protocol 5 minutes 25 seconds no chest pain  1 mm ST segment depression  Peak blood pressure 238/82    Coronary angiogram: May 2026     Prox LAD lesion is 90% stenosed.     1.  High-grade proximal LAD stenosis  2.  No significant disease in the left circumflex or right coronary artery.  3.  Successful PCI proximal LAD with drug-eluting stent implant x1            Lab Results    Chemistry/lipid CBC Cardiac Enzymes/BNP/TSH/INR   Recent Labs   Lab Test 05/02/23  0801   CHOL 132   HDL 49   LDL 66   TRIG 84     Recent Labs   Lab Test 05/02/23  0801 03/29/23  0927   LDL 66 133*     Recent Labs   Lab Test 05/01/23  1012      POTASSIUM 5.1   CHLORIDE 102   CO2 28   *   BUN 16.7   CR 0.87   GFRESTIMATED >90   KRISTEL 9.6     Recent Labs   Lab Test 05/01/23  1012 03/29/23  0927 04/24/20  1527   CR 0.87 0.90 0.81     Recent Labs   Lab Test 03/29/23  0927   A1C 5.2          Recent Labs   Lab Test 05/01/23  1012   WBC 5.5   HGB 15.8   HCT 47.1   MCV 89        Recent Labs   Lab Test 05/01/23  1012 04/24/20  1527   HGB 15.8 15.8    No results for input(s): TROPONINI in the last 86029 hours.  No results for input(s): BNP, NTBNPI, NTBNP in the last 53825 hours.  Recent Labs   Lab Test 03/29/23  0927   TSH 1.61     No results for input(s): INR in the last 92359 hours.

## 2023-06-26 NOTE — PATIENT INSTRUCTIONS
Noam Del Real,    It was a pleasure to see you today at the [facility].     My recommendations after this visit include:    Lipids in 2 months  Double up on atorvastatin to 40 mg  a day    CHAVA Mar MD, FACC, ELLE

## 2023-06-26 NOTE — LETTER
"6/26/2023    Josiane Whipple MD  [address] [facility] [address]    RE: Noam Del Real       Dear Colleague,     I had the pleasure of seeing Noam Del Real in the [facility].      Cardiology Progress Note     Assessment:  Coronary artery disease, history of new onset angina status post proximal LAD stenting in May 2023, no angina  Hypercholesterolemia on low-dose atorvastatin, suboptimal control  Hypertension, good control, whitecoat phenomenon    Plan:  Continue current antiplatelet medications.  Plavix should be continued for at least 6 months and preferred to 12 months.   He has had normal blood pressure readings when he comes for cardiac rehab.  No need to change blood pressure medications.    LDL cholesterol suboptimal.  We will increase dose of atorvastatin to 40 mg a day and lipid lipids in 2 months      Follow-up in May 2024    Subjective:   This is 62 year old male who comes in today for follow-up visit.  I saw him earlier this year because of new onset of chest and arm discomfort.  He underwent coronary angiogram and stenting of proximal LAD.  He has had complete resolution of chest discomfort.  He has been attending cardiac rehab program.  He has not had any new symptoms.  He tolerates medication well.    Review of Systems:   Negative other than history of present illness    Objective:   BP (!) 157/87 (BP Location: Right arm, Patient Position: Sitting, Cuff Size: Adult Large)   Pulse 66   Resp 16   Ht 1.905 m (6' 3\")   Wt 83.5 kg (184 lb)   BMI 23.00 kg/m    Physical Exam:  GENERAL: no distress  NECK: No JVD  LUNGS: Clear to auscultation.  CARDIAC: regular rhythm, S1 & S2 normal.  No heaves, thrills, gallops or murmurs.  ABDOMEN: flat, negative hepatosplenomegaly, soft and non-tender.  EXTREMITIES: No evidence of cyanosis, clubbing or edema.    Current Outpatient Medications   Medication Sig Dispense Refill    acetaminophen (TYLENOL) 500 MG tablet Take 500-1,000 mg by " mouth daily      aspirin (ASA) 81 MG EC tablet Take 1 tablet (81 mg) by mouth daily 90 tablet 4    atorvastatin (LIPITOR) 20 MG tablet Take 1 tablet (20 mg) by mouth daily 90 tablet 3    atorvastatin (LIPITOR) 40 MG tablet Take 1 tablet (40 mg) by mouth daily 90 tablet 3    clopidogrel (PLAVIX) 75 MG tablet Take 1 tablet (75 mg) by mouth daily 90 tablet 3    levocetirizine (XYZAL) 5 MG tablet Take 1 tablet (5 mg) by mouth every evening 90 tablet 3    lisinopril (ZESTRIL) 10 MG tablet Take 1 tablet (10 mg) by mouth daily In the evening 90 tablet 1       Cardiographics:    Stress Test: April 2026  Fransisco protocol 5 minutes 25 seconds no chest pain  1 mm ST segment depression  Peak blood pressure 238/82    Coronary angiogram: May 2026    Prox LAD lesion is 90% stenosed.     1.  High-grade proximal LAD stenosis  2.  No significant disease in the left circumflex or right coronary artery.  3.  Successful PCI proximal LAD with drug-eluting stent implant x1            Lab Results    Chemistry/lipid CBC Cardiac Enzymes/BNP/TSH/INR   Recent Labs   Lab Test 05/02/23  0801   CHOL 132   HDL 49   LDL 66   TRIG 84     Recent Labs   Lab Test 05/02/23  0801 03/29/23  0927   LDL 66 133*     Recent Labs   Lab Test 05/01/23  1012      POTASSIUM 5.1   CHLORIDE 102   CO2 28   *   BUN 16.7   CR 0.87   GFRESTIMATED >90   KRISTEL 9.6     Recent Labs   Lab Test 05/01/23  1012 03/29/23  0927 04/24/20  1527   CR 0.87 0.90 0.81     Recent Labs   Lab Test 03/29/23  0927   A1C 5.2          Recent Labs   Lab Test 05/01/23  1012   WBC 5.5   HGB 15.8   HCT 47.1   MCV 89        Recent Labs   Lab Test 05/01/23  1012 04/24/20  1527   HGB 15.8 15.8    No results for input(s): TROPONINI in the last 12891 hours.  No results for input(s): BNP, NTBNPI, NTBNP in the last 09846 hours.  Recent Labs   Lab Test 03/29/23  0927   TSH 1.61     No results for input(s): INR in the last 10860 hours.                     Thank you for allowing me to  participate in the care of your patient.      Sincerely,     Trever Mar MD     [facility]  cc:   Trever Mar MD  [address] [address]

## 2023-06-28 ENCOUNTER — HOSPITAL ENCOUNTER (OUTPATIENT)
Dept: CARDIAC REHAB | Facility: CLINIC | Age: 63
Discharge: HOME | End: 2023-06-28
Attending: FAMILY MEDICINE
Payer: COMMERCIAL

## 2023-06-28 PROCEDURE — 93798 PHYS/QHP OP CAR RHAB W/ECG: CPT

## 2023-07-03 ENCOUNTER — HOSPITAL ENCOUNTER (OUTPATIENT)
Dept: CARDIAC REHAB | Facility: CLINIC | Age: 63
Discharge: HOME | End: 2023-07-03
Attending: NURSE PRACTITIONER
Payer: COMMERCIAL

## 2023-07-03 PROCEDURE — 93798 PHYS/QHP OP CAR RHAB W/ECG: CPT

## 2023-07-05 ENCOUNTER — HOSPITAL ENCOUNTER (OUTPATIENT)
Dept: CARDIAC REHAB | Facility: CLINIC | Age: 63
Discharge: HOME | End: 2023-07-05
Attending: NURSE PRACTITIONER
Payer: COMMERCIAL

## 2023-07-05 PROCEDURE — 93798 PHYS/QHP OP CAR RHAB W/ECG: CPT | Performed by: REHABILITATION PRACTITIONER

## 2023-07-10 ENCOUNTER — HOSPITAL ENCOUNTER (OUTPATIENT)
Dept: CARDIAC REHAB | Facility: CLINIC | Age: 63
Discharge: HOME | End: 2023-07-10
Attending: NURSE PRACTITIONER
Payer: COMMERCIAL

## 2023-07-10 ENCOUNTER — TELEPHONE (OUTPATIENT)
Dept: CARDIOLOGY | Facility: CLINIC | Age: 63
End: 2023-07-10
Payer: COMMERCIAL

## 2023-07-10 PROCEDURE — 93798 PHYS/QHP OP CAR RHAB W/ECG: CPT

## 2023-07-10 NOTE — TELEPHONE ENCOUNTER
[place] Community Measures Blood Pressure [place] reviewed.  Patients recent blood pressure is outside of guideline parameters at office visit with Dr. Mar on 6/26/23. Patients Blood Pressure is WNL at cardiac rehab on 7/10/23.     Phoebe Seaman LPN

## 2023-07-12 ENCOUNTER — HOSPITAL ENCOUNTER (OUTPATIENT)
Dept: CARDIAC REHAB | Facility: CLINIC | Age: 63
Discharge: HOME | End: 2023-07-12
Attending: NURSE PRACTITIONER
Payer: COMMERCIAL

## 2023-07-12 PROCEDURE — 93798 PHYS/QHP OP CAR RHAB W/ECG: CPT

## 2023-07-17 ENCOUNTER — HOSPITAL ENCOUNTER (OUTPATIENT)
Dept: CARDIAC REHAB | Facility: CLINIC | Age: 63
Discharge: HOME | End: 2023-07-17
Attending: NURSE PRACTITIONER
Payer: COMMERCIAL

## 2023-07-17 PROCEDURE — 93798 PHYS/QHP OP CAR RHAB W/ECG: CPT

## 2023-07-19 ENCOUNTER — HOSPITAL ENCOUNTER (OUTPATIENT)
Dept: CARDIAC REHAB | Facility: CLINIC | Age: 63
Discharge: HOME | End: 2023-07-19
Attending: NURSE PRACTITIONER
Payer: COMMERCIAL

## 2023-07-19 PROCEDURE — 93798 PHYS/QHP OP CAR RHAB W/ECG: CPT

## 2023-07-24 ENCOUNTER — HOSPITAL ENCOUNTER (OUTPATIENT)
Dept: CARDIAC REHAB | Facility: CLINIC | Age: 63
Discharge: HOME | End: 2023-07-24
Attending: NURSE PRACTITIONER
Payer: COMMERCIAL

## 2023-07-24 PROCEDURE — 93798 PHYS/QHP OP CAR RHAB W/ECG: CPT

## 2023-07-26 ENCOUNTER — HOSPITAL ENCOUNTER (OUTPATIENT)
Dept: CARDIAC REHAB | Facility: CLINIC | Age: 63
Discharge: HOME | End: 2023-07-26
Attending: NURSE PRACTITIONER
Payer: COMMERCIAL

## 2023-07-26 PROCEDURE — 93798 PHYS/QHP OP CAR RHAB W/ECG: CPT

## 2023-07-31 ENCOUNTER — HOSPITAL ENCOUNTER (OUTPATIENT)
Dept: CARDIAC REHAB | Facility: CLINIC | Age: 63
Discharge: HOME | End: 2023-07-31
Attending: NURSE PRACTITIONER
Payer: COMMERCIAL

## 2023-07-31 PROCEDURE — 93798 PHYS/QHP OP CAR RHAB W/ECG: CPT

## 2023-08-01 ENCOUNTER — OFFICE VISIT (OUTPATIENT)
Dept: DERMATOLOGY | Facility: CLINIC | Age: 63
End: 2023-08-01
Payer: COMMERCIAL

## 2023-08-01 DIAGNOSIS — D18.01 ANGIOMA OF SKIN: ICD-10-CM

## 2023-08-01 DIAGNOSIS — D23.9 DERMAL NEVUS: ICD-10-CM

## 2023-08-01 DIAGNOSIS — Z85.820 HISTORY OF MELANOMA: Primary | ICD-10-CM

## 2023-08-01 DIAGNOSIS — L81.4 LENTIGO: ICD-10-CM

## 2023-08-01 DIAGNOSIS — L82.1 SEBORRHEIC KERATOSIS: ICD-10-CM

## 2023-08-01 PROCEDURE — 99213 OFFICE O/P EST LOW 20 MIN: CPT | Performed by: DERMATOLOGY

## 2023-08-01 NOTE — LETTER
8/1/2023         RE: Noam Del Real  [address] [facility] [address]        Dear Colleague,    Thank you for referring your patient, Noam Del Real, to the [facility]. Please see a copy of my visit note below.    Noam Del Real is an extremely pleasant 62 year old year old male patient here today for hx of 0.5mm melanoma on back.   He denies any new or changing skin lesions. Patient has no other skin complaints today.  Remainder of the HPI, Meds, PMH, Allergies, FH, and SH was reviewed in chart.      Past Medical History:   Diagnosis Date     Malignant melanoma (H)        Past Surgical History:   Procedure Laterality Date     CV CORONARY ANGIOGRAM N/A 5/2/2023    Procedure: Coronary Angiogram;  Surgeon: Peterson Fernandez MD;  Location: [facility] CV     CV LEFT HEART CATH N/A 5/2/2023    Procedure: Left Heart Catheterization;  Surgeon: Peterson Fernandez MD;  Location: [facility] CV     CV PCI N/A 5/2/2023    Procedure: Percutaneous Coronary Intervention;  Surgeon: Peterson Fernandez MD;  Location: [facility] LAB CV     DENTAL SURGERY      [place]      ZZC ANESTH,LOWER ARM SURGERY  2002    Left        Family History   Problem Relation Age of Onset     Prostate Cancer Father         Removal of prostate for elevated PSA. Unsure of cancer.        Social History     Socioeconomic History     Marital status:      Spouse name: Belkis Ledesma     Number of children: 0     Years of education: 12+     Highest education level: Not on file   Occupational History     Employer: OTHER   Tobacco Use     Smoking status: Never     Smokeless tobacco: Never   Substance and Sexual Activity     Alcohol use: Yes     Comment: Rarely     Drug use: No     Sexual activity: Yes     Partners: Female   Other Topics Concern     Parent/sibling w/ CABG, MI or angioplasty before 65F 55M? No   Social History Narrative     Not on file     Social Determinants of Health     Financial Resource  Strain: Not on file   Food Insecurity: Not on file   Transportation Needs: Not on file   Physical Activity: Not on file   Stress: Not on file   Social Connections: Not on file   Intimate Partner Violence: Not on file   Housing Stability: Not on file       Outpatient Encounter Medications as of 8/1/2023   Medication Sig Dispense Refill     acetaminophen (TYLENOL) 500 MG tablet Take 500-1,000 mg by mouth daily       aspirin (ASA) 81 MG EC tablet Take 1 tablet (81 mg) by mouth daily 90 tablet 4     atorvastatin (LIPITOR) 20 MG tablet Take 1 tablet (20 mg) by mouth daily 90 tablet 3     atorvastatin (LIPITOR) 40 MG tablet Take 1 tablet (40 mg) by mouth daily 90 tablet 3     clopidogrel (PLAVIX) 75 MG tablet Take 1 tablet (75 mg) by mouth daily 90 tablet 3     levocetirizine (XYZAL) 5 MG tablet Take 1 tablet (5 mg) by mouth every evening 90 tablet 3     lisinopril (ZESTRIL) 10 MG tablet Take 1 tablet (10 mg) by mouth daily In the evening 90 tablet 1     No facility-administered encounter medications on file as of 8/1/2023.             O:   NAD, WDWN, Alert & Oriented, Mood & Affect wnl, Vitals stable   Here today alone   General appearance normal   Vitals stable   Alert, oriented and in no acute distress      Following lymph nodes palpated: Occipital, Cervical, Supraclavicular , axilla, inguinal no lad      Stuck on papules and brown macules on trunk and ext   Red papules on trunk  Flesh colored papules on trunk     The remainder of the full exam was normal; the following areas were examined:  conjunctiva/lids, , neck, peripheral vascular system, abdomen, lymph nodes, digits/nails, eccrine and apocrine glands, scalp/hair, face, neck, chest, abdomen, buttocks, back, RUE, LUE, RLE, LLE       Eyes: Conjunctivae/lids:Normal     ENT: Lips, buccal mucosa, tongue: normal    MSK:Normal    Cardiovascular: peripheral edema none    Pulm: Breathing Normal    Lymph Nodes: No Head and Neck Lymphadenopathy     Neuro/Psych:  Orientation:Alert and Orientedx3 ; Mood/Affect:normal       A/P:  1. Seborrheic keratosis, lentigo, angioma, dermal nevus, hx of melanoma   It was a pleasure speaking to Noam Del Real today.  Previous clinic notes and pertinent laboratory tests were reviewed prior to Noam Del Real's visit.  Nature and genetics of benign skin lesions dicussed with patient.  Signs and Symptoms of skin cancer discussed with patient.  Patient encouraged to perform monthly skin exams.  UV precautions reviewed with patient.  Return to clinic 6 months      Again, thank you for allowing me to participate in the care of your patient.        Sincerely,        Mayito Beltran MD

## 2023-08-01 NOTE — PROGRESS NOTES
Noam Del Real is an extremely pleasant 62 year old year old male patient here today for hx of 0.5mm melanoma on back.   He denies any new or changing skin lesions. Patient has no other skin complaints today.  Remainder of the HPI, Meds, PMH, Allergies, FH, and SH was reviewed in chart.      Past Medical History:   Diagnosis Date    Malignant melanoma (H)        Past Surgical History:   Procedure Laterality Date    CV CORONARY ANGIOGRAM N/A 5/2/2023    Procedure: Coronary Angiogram;  Surgeon: Peterson Fernandez MD;  Location: [facility] CATH LAB CV    CV LEFT HEART CATH N/A 5/2/2023    Procedure: Left Heart Catheterization;  Surgeon: Peterson Fernandez MD;  Location: [facility] LAB CV    CV PCI N/A 5/2/2023    Procedure: Percutaneous Coronary Intervention;  Surgeon: Peterson Fernandez MD;  Location: [facility] CV    DENTAL SURGERY      [place]     ZZC ANESTH,LOWER ARM SURGERY  2002    Left        Family History   Problem Relation Age of Onset    Prostate Cancer Father         Removal of prostate for elevated PSA. Unsure of cancer.        Social History     Socioeconomic History    Marital status:      Spouse name: Belkis Ledesma    Number of children: 0    Years of education: 12+    Highest education level: Not on file   Occupational History     Employer: OTHER   Tobacco Use    Smoking status: Never    Smokeless tobacco: Never   Substance and Sexual Activity    Alcohol use: Yes     Comment: Rarely    Drug use: No    Sexual activity: Yes     Partners: Female   Other Topics Concern    Parent/sibling w/ CABG, MI or angioplasty before 65F 55M? No   Social History Narrative    Not on file     Social Determinants of Health     Financial Resource Strain: Not on file   Food Insecurity: Not on file   Transportation Needs: Not on file   Physical Activity: Not on file   Stress: Not on file   Social Connections: Not on file   Intimate Partner Violence: Not on file   Housing Stability: Not on file       Outpatient  Encounter Medications as of 8/1/2023   Medication Sig Dispense Refill    acetaminophen (TYLENOL) 500 MG tablet Take 500-1,000 mg by mouth daily      aspirin (ASA) 81 MG EC tablet Take 1 tablet (81 mg) by mouth daily 90 tablet 4    atorvastatin (LIPITOR) 20 MG tablet Take 1 tablet (20 mg) by mouth daily 90 tablet 3    atorvastatin (LIPITOR) 40 MG tablet Take 1 tablet (40 mg) by mouth daily 90 tablet 3    clopidogrel (PLAVIX) 75 MG tablet Take 1 tablet (75 mg) by mouth daily 90 tablet 3    levocetirizine (XYZAL) 5 MG tablet Take 1 tablet (5 mg) by mouth every evening 90 tablet 3    lisinopril (ZESTRIL) 10 MG tablet Take 1 tablet (10 mg) by mouth daily In the evening 90 tablet 1     No facility-administered encounter medications on file as of 8/1/2023.             O:   NAD, WDWN, Alert & Oriented, Mood & Affect wnl, Vitals stable   Here today alone   General appearance normal   Vitals stable   Alert, oriented and in no acute distress      Following lymph nodes palpated: Occipital, Cervical, Supraclavicular , axilla, inguinal no lad      Stuck on papules and brown macules on trunk and ext   Red papules on trunk  Flesh colored papules on trunk     The remainder of the full exam was normal; the following areas were examined:  conjunctiva/lids, , neck, peripheral vascular system, abdomen, lymph nodes, digits/nails, eccrine and apocrine glands, scalp/hair, face, neck, chest, abdomen, buttocks, back, RUE, LUE, RLE, LLE       Eyes: Conjunctivae/lids:Normal     ENT: Lips, buccal mucosa, tongue: normal    MSK:Normal    Cardiovascular: peripheral edema none    Pulm: Breathing Normal    Lymph Nodes: No Head and Neck Lymphadenopathy     Neuro/Psych: Orientation:Alert and Orientedx3 ; Mood/Affect:normal       A/P:  1. Seborrheic keratosis, lentigo, angioma, dermal nevus, hx of melanoma   It was a pleasure speaking to Noam Del Real today.  Previous clinic notes and pertinent laboratory tests were reviewed prior to Noam Del Real's  visit.  Nature and genetics of benign skin lesions dicussed with patient.  Signs and Symptoms of skin cancer discussed with patient.  Patient encouraged to perform monthly skin exams.  UV precautions reviewed with patient.  Return to clinic 6 months

## 2023-08-25 ENCOUNTER — LAB (OUTPATIENT)
Dept: LAB | Facility: CLINIC | Age: 63
End: 2023-08-25
Payer: COMMERCIAL

## 2023-08-25 DIAGNOSIS — I25.10 CORONARY ARTERY DISEASE INVOLVING NATIVE CORONARY ARTERY OF NATIVE HEART WITHOUT ANGINA PECTORIS: ICD-10-CM

## 2023-08-25 DIAGNOSIS — I25.119 CORONARY ARTERY DISEASE INVOLVING NATIVE CORONARY ARTERY OF NATIVE HEART WITH ANGINA PECTORIS (H): ICD-10-CM

## 2023-08-25 LAB
CHOLEST SERPL-MCNC: 107 MG/DL
HDLC SERPL-MCNC: 56 MG/DL
LDLC SERPL CALC-MCNC: 40 MG/DL
NONHDLC SERPL-MCNC: 51 MG/DL
TRIGL SERPL-MCNC: 54 MG/DL

## 2023-08-25 PROCEDURE — 80061 LIPID PANEL: CPT

## 2023-08-25 PROCEDURE — 36415 COLL VENOUS BLD VENIPUNCTURE: CPT

## 2023-10-04 DIAGNOSIS — I10 HYPERTENSIVE RESPONSE TO EXERCISE: ICD-10-CM

## 2023-10-13 RX ORDER — LISINOPRIL 10 MG/1
10 TABLET ORAL DAILY
Qty: 90 TABLET | Refills: 1 | Status: SHIPPED | OUTPATIENT
Start: 2023-10-13

## 2024-07-07 NOTE — Clinical Note
Conscious sedation is planned for this procedure.    Provider immediate assessment completed.  Has not smoked in 5 days and says she has no desire to continue smoking   Smoking cessation at discharge

## 2024-08-29 ENCOUNTER — HOSPITAL ENCOUNTER (EMERGENCY)
Facility: CLINIC | Age: 64
Discharge: HOME | End: 2024-08-30
Attending: EMERGENCY MEDICINE | Admitting: EMERGENCY MEDICINE

## 2024-08-29 ENCOUNTER — APPOINTMENT (OUTPATIENT)
Dept: CT IMAGING | Facility: CLINIC | Age: 64
End: 2024-08-29
Attending: EMERGENCY MEDICINE

## 2024-08-29 DIAGNOSIS — R10.9 FLANK PAIN: ICD-10-CM

## 2024-08-29 DIAGNOSIS — N20.1 LEFT URETERAL STONE: ICD-10-CM

## 2024-08-29 DIAGNOSIS — R11.2 NAUSEA AND VOMITING, UNSPECIFIED VOMITING TYPE: ICD-10-CM

## 2024-08-29 LAB
ALBUMIN SERPL BCG-MCNC: 4.4 G/DL (ref 3.5–5.2)
ALBUMIN UR-MCNC: NEGATIVE MG/DL
ALP SERPL-CCNC: 79 U/L (ref 40–150)
ALT SERPL W P-5'-P-CCNC: 28 U/L (ref 0–70)
ANION GAP SERPL CALCULATED.3IONS-SCNC: 15 MMOL/L (ref 7–15)
APPEARANCE UR: CLEAR
AST SERPL W P-5'-P-CCNC: 28 U/L (ref 0–45)
BASOPHILS # BLD AUTO: 0 10E3/UL (ref 0–0.2)
BASOPHILS NFR BLD AUTO: 0 %
BILIRUB SERPL-MCNC: 0.4 MG/DL
BILIRUB UR QL STRIP: NEGATIVE
BUN SERPL-MCNC: 26.2 MG/DL (ref 8–23)
CALCIUM SERPL-MCNC: 9.3 MG/DL (ref 8.8–10.4)
CHLORIDE SERPL-SCNC: 102 MMOL/L (ref 98–107)
COLOR UR AUTO: YELLOW
CREAT SERPL-MCNC: 1.18 MG/DL (ref 0.67–1.17)
EGFRCR SERPLBLD CKD-EPI 2021: 70 ML/MIN/1.73M2
EOSINOPHIL # BLD AUTO: 0 10E3/UL (ref 0–0.7)
EOSINOPHIL NFR BLD AUTO: 0 %
ERYTHROCYTE [DISTWIDTH] IN BLOOD BY AUTOMATED COUNT: 12 % (ref 10–15)
GLUCOSE SERPL-MCNC: 186 MG/DL (ref 70–99)
GLUCOSE UR STRIP-MCNC: 50 MG/DL
HCO3 SERPL-SCNC: 23 MMOL/L (ref 22–29)
HCT VFR BLD AUTO: 41.4 % (ref 40–53)
HGB BLD-MCNC: 14.5 G/DL (ref 13.3–17.7)
HGB UR QL STRIP: NEGATIVE
HOLD SPECIMEN: NORMAL
IMM GRANULOCYTES # BLD: 0 10E3/UL
IMM GRANULOCYTES NFR BLD: 0 %
KETONES UR STRIP-MCNC: 80 MG/DL
LEUKOCYTE ESTERASE UR QL STRIP: NEGATIVE
LIPASE SERPL-CCNC: 15 U/L (ref 13–60)
LYMPHOCYTES # BLD AUTO: 0.5 10E3/UL (ref 0.8–5.3)
LYMPHOCYTES NFR BLD AUTO: 4 %
MCH RBC QN AUTO: 30.5 PG (ref 26.5–33)
MCHC RBC AUTO-ENTMCNC: 35 G/DL (ref 31.5–36.5)
MCV RBC AUTO: 87 FL (ref 78–100)
MONOCYTES # BLD AUTO: 0.5 10E3/UL (ref 0–1.3)
MONOCYTES NFR BLD AUTO: 4 %
NEUTROPHILS # BLD AUTO: 11 10E3/UL (ref 1.6–8.3)
NEUTROPHILS NFR BLD AUTO: 92 %
NITRATE UR QL: NEGATIVE
NRBC # BLD AUTO: 0 10E3/UL
NRBC BLD AUTO-RTO: 0 /100
PH UR STRIP: 7 [PH] (ref 5–7)
PLATELET # BLD AUTO: 255 10E3/UL (ref 150–450)
POTASSIUM SERPL-SCNC: 3.8 MMOL/L (ref 3.4–5.3)
PROT SERPL-MCNC: 7.1 G/DL (ref 6.4–8.3)
RBC # BLD AUTO: 4.75 10E6/UL (ref 4.4–5.9)
RBC URINE: 14 /HPF
SODIUM SERPL-SCNC: 140 MMOL/L (ref 135–145)
SP GR UR STRIP: 1.01 (ref 1–1.03)
T4 FREE SERPL-MCNC: 1.37 NG/DL (ref 0.9–1.7)
TROPONIN T SERPL HS-MCNC: 9 NG/L
TSH SERPL DL<=0.005 MIU/L-ACNC: 4.49 UIU/ML (ref 0.3–4.2)
UROBILINOGEN UR STRIP-MCNC: NORMAL MG/DL
WBC # BLD AUTO: 12 10E3/UL (ref 4–11)
WBC URINE: 3 /HPF

## 2024-08-29 PROCEDURE — 250N000009 HC RX 250: Performed by: EMERGENCY MEDICINE

## 2024-08-29 PROCEDURE — 250N000013 HC RX MED GY IP 250 OP 250 PS 637: Performed by: EMERGENCY MEDICINE

## 2024-08-29 PROCEDURE — 83690 ASSAY OF LIPASE: CPT | Performed by: EMERGENCY MEDICINE

## 2024-08-29 PROCEDURE — 84439 ASSAY OF FREE THYROXINE: CPT | Performed by: EMERGENCY MEDICINE

## 2024-08-29 PROCEDURE — 84484 ASSAY OF TROPONIN QUANT: CPT | Performed by: EMERGENCY MEDICINE

## 2024-08-29 PROCEDURE — 93010 ELECTROCARDIOGRAM REPORT: CPT | Performed by: EMERGENCY MEDICINE

## 2024-08-29 PROCEDURE — 96375 TX/PRO/DX INJ NEW DRUG ADDON: CPT

## 2024-08-29 PROCEDURE — 84075 ASSAY ALKALINE PHOSPHATASE: CPT | Performed by: EMERGENCY MEDICINE

## 2024-08-29 PROCEDURE — 85025 COMPLETE CBC W/AUTO DIFF WBC: CPT | Performed by: EMERGENCY MEDICINE

## 2024-08-29 PROCEDURE — 99285 EMERGENCY DEPT VISIT HI MDM: CPT | Performed by: EMERGENCY MEDICINE

## 2024-08-29 PROCEDURE — 96374 THER/PROPH/DIAG INJ IV PUSH: CPT | Mod: 59

## 2024-08-29 PROCEDURE — 36415 COLL VENOUS BLD VENIPUNCTURE: CPT | Performed by: EMERGENCY MEDICINE

## 2024-08-29 PROCEDURE — 93005 ELECTROCARDIOGRAM TRACING: CPT

## 2024-08-29 PROCEDURE — 96376 TX/PRO/DX INJ SAME DRUG ADON: CPT

## 2024-08-29 PROCEDURE — 250N000011 HC RX IP 250 OP 636: Performed by: EMERGENCY MEDICINE

## 2024-08-29 PROCEDURE — 81001 URINALYSIS AUTO W/SCOPE: CPT | Performed by: EMERGENCY MEDICINE

## 2024-08-29 PROCEDURE — 74177 CT ABD & PELVIS W/CONTRAST: CPT

## 2024-08-29 PROCEDURE — 96361 HYDRATE IV INFUSION ADD-ON: CPT

## 2024-08-29 PROCEDURE — 84443 ASSAY THYROID STIM HORMONE: CPT | Performed by: EMERGENCY MEDICINE

## 2024-08-29 PROCEDURE — 258N000003 HC RX IP 258 OP 636: Performed by: EMERGENCY MEDICINE

## 2024-08-29 PROCEDURE — 99285 EMERGENCY DEPT VISIT HI MDM: CPT | Mod: 25

## 2024-08-29 RX ORDER — OXYCODONE AND ACETAMINOPHEN 5; 325 MG/1; MG/1
2 TABLET ORAL ONCE
Status: COMPLETED | OUTPATIENT
Start: 2024-08-29 | End: 2024-08-29

## 2024-08-29 RX ORDER — HYDROMORPHONE HYDROCHLORIDE 1 MG/ML
0.5 INJECTION, SOLUTION INTRAMUSCULAR; INTRAVENOUS; SUBCUTANEOUS EVERY 30 MIN PRN
Status: DISCONTINUED | OUTPATIENT
Start: 2024-08-29 | End: 2024-08-30 | Stop reason: HOSPADM

## 2024-08-29 RX ORDER — TAMSULOSIN HYDROCHLORIDE 0.4 MG/1
0.4 CAPSULE ORAL DAILY
Status: DISCONTINUED | OUTPATIENT
Start: 2024-08-30 | End: 2024-08-29 | Stop reason: DRUGHIGH

## 2024-08-29 RX ORDER — IOPAMIDOL 755 MG/ML
88 INJECTION, SOLUTION INTRAVASCULAR ONCE
Status: COMPLETED | OUTPATIENT
Start: 2024-08-29 | End: 2024-08-29

## 2024-08-29 RX ORDER — TAMSULOSIN HYDROCHLORIDE 0.4 MG/1
0.4 CAPSULE ORAL DAILY
Status: DISCONTINUED | OUTPATIENT
Start: 2024-08-29 | End: 2024-08-30 | Stop reason: HOSPADM

## 2024-08-29 RX ORDER — ONDANSETRON 2 MG/ML
4 INJECTION INTRAMUSCULAR; INTRAVENOUS ONCE
Status: COMPLETED | OUTPATIENT
Start: 2024-08-29 | End: 2024-08-29

## 2024-08-29 RX ADMIN — ONDANSETRON 4 MG: 2 INJECTION INTRAMUSCULAR; INTRAVENOUS at 22:00

## 2024-08-29 RX ADMIN — HYDROMORPHONE HYDROCHLORIDE 0.5 MG: 1 INJECTION, SOLUTION INTRAMUSCULAR; INTRAVENOUS; SUBCUTANEOUS at 22:23

## 2024-08-29 RX ADMIN — SODIUM CHLORIDE 62 ML: 9 INJECTION, SOLUTION INTRAVENOUS at 22:58

## 2024-08-29 RX ADMIN — OXYCODONE HYDROCHLORIDE AND ACETAMINOPHEN 2 TABLET: 5; 325 TABLET ORAL at 23:41

## 2024-08-29 RX ADMIN — SODIUM CHLORIDE 1000 ML: 9 INJECTION, SOLUTION INTRAVENOUS at 23:18

## 2024-08-29 RX ADMIN — TAMSULOSIN HYDROCHLORIDE 0.4 MG: 0.4 CAPSULE ORAL at 23:41

## 2024-08-29 RX ADMIN — IOPAMIDOL 88 ML: 755 INJECTION, SOLUTION INTRAVENOUS at 22:58

## 2024-08-29 RX ADMIN — HYDROMORPHONE HYDROCHLORIDE 0.5 MG: 1 INJECTION, SOLUTION INTRAMUSCULAR; INTRAVENOUS; SUBCUTANEOUS at 23:18

## 2024-08-29 ASSESSMENT — ACTIVITIES OF DAILY LIVING (ADL)
ADLS_ACUITY_SCORE: 35
ADLS_ACUITY_SCORE: 35

## 2024-08-29 ASSESSMENT — COLUMBIA-SUICIDE SEVERITY RATING SCALE - C-SSRS
6. HAVE YOU EVER DONE ANYTHING, STARTED TO DO ANYTHING, OR PREPARED TO DO ANYTHING TO END YOUR LIFE?: NO
2. HAVE YOU ACTUALLY HAD ANY THOUGHTS OF KILLING YOURSELF IN THE PAST MONTH?: NO
1. IN THE PAST MONTH, HAVE YOU WISHED YOU WERE DEAD OR WISHED YOU COULD GO TO SLEEP AND NOT WAKE UP?: NO

## 2024-08-30 VITALS
TEMPERATURE: 98.1 F | SYSTOLIC BLOOD PRESSURE: 159 MMHG | BODY MASS INDEX: 22.38 KG/M2 | RESPIRATION RATE: 10 BRPM | HEIGHT: 75 IN | OXYGEN SATURATION: 98 % | DIASTOLIC BLOOD PRESSURE: 73 MMHG | WEIGHT: 180 LBS | HEART RATE: 60 BPM

## 2024-08-30 RX ORDER — OXYCODONE HYDROCHLORIDE 5 MG/1
5 TABLET ORAL EVERY 4 HOURS PRN
Qty: 12 TABLET | Refills: 0 | Status: SHIPPED | OUTPATIENT
Start: 2024-08-30 | End: 2024-09-03

## 2024-08-30 RX ORDER — ONDANSETRON 4 MG/1
4 TABLET, ORALLY DISINTEGRATING ORAL EVERY 8 HOURS PRN
Qty: 12 TABLET | Refills: 0 | Status: SHIPPED | OUTPATIENT
Start: 2024-08-30 | End: 2024-09-02

## 2024-08-30 RX ORDER — TAMSULOSIN HYDROCHLORIDE 0.4 MG/1
0.4 CAPSULE ORAL DAILY
Qty: 7 CAPSULE | Refills: 0 | Status: SHIPPED | OUTPATIENT
Start: 2024-08-30 | End: 2024-09-06

## 2024-08-30 NOTE — DISCHARGE INSTRUCTIONS
Zofran as needed for nausea, or vomiting.    Take ibuprofen, 400 mg, 4 times per day if needed for pain.  You may add acetaminophen 1000 mg 4 times per day if needed for pain.    You may add oxycodone 5 mg, 1-2 tablets up to every 6 hours if needed for pain.  Try to use this primarily only at night to help with sleep.    Do not use alcohol, operate machinery, drive, or climb on ladders, or perform other complex motor activity or make important decisions for 8 hours after taking oxycodone. Use docusate (100mg) 2 times a day to prevent constipation while on narcotics.  You may use ondansetron 4 mg up to every 6 hours if needed for nausea.  Return to the emergency department if you develop a fever greater than 100.4, vomiting, inability to take food and fluids, or worsening pain.      Follow up with urology.

## 2024-08-30 NOTE — ED PROVIDER NOTES
ED Provider Note  [facility]      History     Chief Complaint   Patient presents with    Abdominal Pain     LLQ abd that started at 1800 with radiation into back. Pt reports N/V. Pt denied fevers, loose stools, Cp, SOB, urinary symptoms. Pt has cardiac hx with stent placement in 5/23.      HPI  Noam Del Real is a 63 year old male who has medical history significant for coronary artery disease status post stenting, hyperlipidemia, hypertension, however not taking any daily medications currently other than baby aspirin, Aleve, and Benadryl, presenting the emergency department with wife for concerns regarding left-sided abdominal pain.  Patient states that he ate a meal around 4 PM, and shortly thereafter began feeling abdominal upset, and began with multiple episodes of nonbloody, nonbilious emesis.  Patient with ongoing left-sided abdominal pains, and also does have some paresthesias of the extremities, and this concerned the patient as these were his symptoms prior to prior stenting that was performed in May 2023.  He denies any chest pain.  No shortness of breath.  No urinary symptoms.  No bowel movement changes.        Independent Historian:        Review of External Notes:  I reviewed June 26, 2023 office visit.  Patient had proximal LAD stenting in May 2023.  Was prescribed atorvastatin at that time.  Hypertension noted to be whitecoat phenomenon      Allergies:  No Known Allergies    Problem List:    Patient Active Problem List    Diagnosis Date Noted    Coronary artery disease involving native coronary artery of native heart with angina pectoris (H24) 05/11/2023     Priority: Medium    Dyslipidemia, goal LDL below 70 05/11/2023     Priority: Medium    Primary hypertension 05/11/2023     Priority: Medium    Abnormal cardiovascular stress test 05/02/2023     Priority: Medium    Marijuana smoker 01/03/2013     Priority: Medium     Intermittent use, not more than once every couple of  "weeks.           Past Medical History:    Past Medical History:   Diagnosis Date    Malignant melanoma (H)        Past Surgical History:    Past Surgical History:   Procedure Laterality Date    CV CORONARY ANGIOGRAM N/A 5/2/2023    Procedure: Coronary Angiogram;  Surgeon: Peterson Fernandez MD;  Location: [facility] CV    CV LEFT HEART CATH N/A 5/2/2023    Procedure: Left Heart Catheterization;  Surgeon: Peterson Fernandez MD;  Location: [facility] CV    CV PCI N/A 5/2/2023    Procedure: Percutaneous Coronary Intervention;  Surgeon: Peterson Fernandez MD;  Location: [facility] CV    DENTAL SURGERY      [place]     ZZC ANESTH,LOWER ARM SURGERY  2002    Left       Family History:    Family History   Problem Relation Age of Onset    Prostate Cancer Father         Removal of prostate for elevated PSA. Unsure of cancer.        Social History:  Marital Status:   [2]  Social History     Tobacco Use    Smoking status: Never    Smokeless tobacco: Never   Substance Use Topics    Alcohol use: Yes     Comment: Rarely    Drug use: No        Medications:    ondansetron (ZOFRAN ODT) 4 MG ODT tab  oxyCODONE (ROXICODONE) 5 MG tablet  tamsulosin (FLOMAX) 0.4 MG capsule  acetaminophen (TYLENOL) 500 MG tablet  aspirin (ASA) 81 MG EC tablet  atorvastatin (LIPITOR) 20 MG tablet  atorvastatin (LIPITOR) 40 MG tablet  clopidogrel (PLAVIX) 75 MG tablet  levocetirizine (XYZAL) 5 MG tablet  lisinopril (ZESTRIL) 10 MG tablet          Review of Systems  A medically appropriate review of systems was performed with pertinent positives and negatives noted in the HPI, and all other systems negative.    Physical Exam   Patient Vitals for the past 24 hrs:   BP Temp Temp src Pulse Resp SpO2 Height Weight   08/30/24 0003 (!) 159/73 -- -- 60 -- 98 % -- --   08/29/24 2203 (!) 173/79 -- -- 52 10 -- -- --   08/29/24 2149 (!) 185/81 98.1  F (36.7  C) Oral 52 16 99 % -- --   08/29/24 2148 -- -- -- -- -- -- 1.905 m (6' 3\") 81.6 kg (180 " lb)          Physical Exam  General: alert and in no acute distress on arrival  Head: atraumatic, normocephalic  Lungs:  nonlabored  CV:  extremities warm and perfused  Abd: nondistended  Skin: no rashes, no diaphoresis and skin color normal  Neuro: Patient awake, alert, speech is fluent,   Psychiatric: affect/mood normal,        ED Course                 Procedures              EKG Interpretation:      Interpreted by Jermaine Hedrick MD  Time reviewed: 2147  Symptoms at time of EKG: abd pain   Rhythm: normal sinus   Rate: normal  Axis: normal  Ectopy: none  Conduction: normal  ST Segments/ T Waves: No ST-T wave changes  Q Waves: none    Clinical Impression: normal EKG                   Results for orders placed or performed during the hospital encounter of 08/29/24 (from the past 24 hour(s))   [place] Draw    Narrative    The following orders were created for panel order [place] Draw.  Procedure                               Abnormality         Status                     ---------                               -----------         ------                     Extra Blue Top Tube[[id number]]                              Final result               Extra Red Top Tube[[id number]]                                                            Please view results for these tests on the individual orders.   CBC with platelets, differential    Narrative    The following orders were created for panel order CBC with platelets, differential.  Procedure                               Abnormality         Status                     ---------                               -----------         ------                     CBC with platelets and d...[[id number]]  Abnormal            Final result                 Please view results for these tests on the individual orders.   Comprehensive metabolic panel   Result Value Ref Range    Sodium 140 135 - 145 mmol/L    Potassium 3.8 3.4 - 5.3 mmol/L    Carbon Dioxide (CO2) 23 22 - 29 mmol/L    Anion  Gap 15 7 - 15 mmol/L    Urea Nitrogen 26.2 (H) 8.0 - 23.0 mg/dL    Creatinine 1.18 (H) 0.67 - 1.17 mg/dL    GFR Estimate 70 >60 mL/min/1.73m2    Calcium 9.3 8.8 - 10.4 mg/dL    Chloride 102 98 - 107 mmol/L    Glucose 186 (H) 70 - 99 mg/dL    Alkaline Phosphatase 79 40 - 150 U/L    AST 28 0 - 45 U/L    ALT 28 0 - 70 U/L    Protein Total 7.1 6.4 - 8.3 g/dL    Albumin 4.4 3.5 - 5.2 g/dL    Bilirubin Total 0.4 <=1.2 mg/dL   Troponin T, High Sensitivity   Result Value Ref Range    Troponin T, High Sensitivity 9 <=22 ng/L   Lipase   Result Value Ref Range    Lipase 15 13 - 60 U/L   TSH with free T4 reflex   Result Value Ref Range    TSH 4.49 (H) 0.30 - 4.20 uIU/mL   Extra Blue Top Tube   Result Value Ref Range    Hold Specimen JIC    CBC with platelets and differential   Result Value Ref Range    WBC Count 12.0 (H) 4.0 - 11.0 10e3/uL    RBC Count 4.75 4.40 - 5.90 10e6/uL    Hemoglobin 14.5 13.3 - 17.7 g/dL    Hematocrit 41.4 40.0 - 53.0 %    MCV 87 78 - 100 fL    MCH 30.5 26.5 - 33.0 pg    MCHC 35.0 31.5 - 36.5 g/dL    RDW 12.0 10.0 - 15.0 %    Platelet Count 255 150 - 450 10e3/uL    % Neutrophils 92 %    % Lymphocytes 4 %    % Monocytes 4 %    % Eosinophils 0 %    % Basophils 0 %    % Immature Granulocytes 0 %    NRBCs per 100 WBC 0 <1 /100    Absolute Neutrophils 11.0 (H) 1.6 - 8.3 10e3/uL    Absolute Lymphocytes 0.5 (L) 0.8 - 5.3 10e3/uL    Absolute Monocytes 0.5 0.0 - 1.3 10e3/uL    Absolute Eosinophils 0.0 0.0 - 0.7 10e3/uL    Absolute Basophils 0.0 0.0 - 0.2 10e3/uL    Absolute Immature Granulocytes 0.0 <=0.4 10e3/uL    Absolute NRBCs 0.0 10e3/uL   T4 free   Result Value Ref Range    Free T4 1.37 0.90 - 1.70 ng/dL   CT Abdomen Pelvis w Contrast    Narrative    EXAM: CT ABDOMEN PELVIS W CONTRAST  LOCATION: [facility]  DATE: 8/29/2024    INDICATION: Left lateral abdominal pain.  COMPARISON: None.  TECHNIQUE: CT scan of the abdomen and pelvis was performed following injection of IV contrast.  Multiplanar reformats were obtained. Dose reduction techniques were used.  CONTRAST: 88 mL Isovue-370.    FINDINGS:   LOWER CHEST: Minimal sliding esophageal hiatal hernia.    HEPATOBILIARY: Normal.    PANCREAS: Normal.    SPLEEN: Normal size. Multiple granulomas. Patent splenic vein.    ADRENAL GLANDS: Normal.    KIDNEYS/BLADDER: Obstructing 6 x 3 x 5 mm calculus (average Hounsfield units 991) left UVJ. Marked left ureteral dilatation and hydronephrosis. Mild left-sided nephromegaly with moderate perinephric edema. No obstructing right ureteral calculi.    BOWEL: No obstruction or inflammatory change. Appendix unremarkable.    LYMPH NODES: No lymphadenopathy.    VASCULATURE: Normal-caliber aorta. Moderate vascular calcification.    PELVIC ORGANS: Normal.    MUSCULOSKELETAL: Degenerative disc changes throughout the spine greatest at the L5-S1 level.      Impression    IMPRESSION:   1.  Obstructing 6 x 3 x 5 mm left UVJ calculus. Marked left ureteral dilatation and hydronephrosis.    2.  Moderate vascular calcification.   UA with Microscopic reflex to Culture    Specimen: Urine, NOS   Result Value Ref Range    Color Urine Yellow Colorless, Straw, Light Yellow, Yellow    Appearance Urine Clear Clear    Glucose Urine 50 (A) Negative mg/dL    Bilirubin Urine Negative Negative    Ketones Urine 80 (A) Negative mg/dL    Specific Gravity Urine 1.015 1.003 - 1.035    Blood Urine Negative Negative    pH Urine 7.0 5.0 - 7.0    Protein Albumin Urine Negative Negative mg/dL    Urobilinogen Urine Normal Normal, 2.0 mg/dL    Nitrite Urine Negative Negative    Leukocyte Esterase Urine Negative Negative    RBC Urine 14 (H) <=2 /HPF    WBC Urine 3 <=5 /HPF    Narrative    Urine Culture not indicated       MEDICATIONS GIVEN IN THE EMERGENCY DEPARTMENT:  Medications   HYDROmorphone (PF) (DILAUDID) injection 0.5 mg (0.5 mg Intravenous $Given 8/29/24 0145)   tamsulosin (FLOMAX) capsule 0.4 mg (0.4 mg Oral $Given 8/29/24 0176)    ondansetron (ZOFRAN) injection 4 mg (4 mg Intravenous $Given 8/29/24 2200)   sodium chloride 0.9% BOLUS 1,000 mL (0 mLs Intravenous Stopped 8/30/24 0007)   iopamidol (ISOVUE-370) solution 88 mL (88 mLs Intravenous $Given 8/29/24 2258)   sodium chloride 0.9 % bag 500mL for CT scan flush use (62 mLs As instructed $Given 8/29/24 2258)   oxyCODONE-acetaminophen (PERCOCET) 5-325 MG per tablet 2 tablet (2 tablets Oral $Given 8/29/24 2011)           Independent Interpretation (X-rays, CTs, rhythm strip):  Left UVJ stone is identified with hydronephrosis and hydroureter.  Images personally reviewed.  Radiology measures stone at 6 x 5 mm    Consultations/Discussion of Management or Tests:         Social Determinants of Health affecting care:         Assessments & Plan (with Medical Decision Making)  63 year old male who presents to the Emergency Department for evaluation of abdominal pain, with nausea, vomiting.  Does have history of prior angiogram showing proximal LAD lesion which had 90% stenosis, and stent placed.  Patient with no significant disease in the left circumflex or RCA.    Patient now with abdominal pain, nausea, vomiting, with some paresthesias.    EKG unremarkable.  Does arrive slightly hypertensive.    Ultimately, CT scan shows distal left UVJ stone with hydronephrosis and hydroureter which is present.  After the above medications, patient did feel much improved.  Based on the degree of hydronephrosis, hydroureter, with initial abdominal pain, considered hospitalization, however patient did feel much improved after analgesic medications.    Patient does have minimally elevated serum white blood cell count of 12.0, however urinalysis does not show any signs of infection.  Therefore, given the lack of infected  ureteral stone, afebrile nature of the patient, and tolerating fluids in the emergency department, I feel it is reasonable for discharge home, close urology follow-up, with expectant management.   Patient discharged home with tamsulosin, Flomax, oxycodone, urine strainer, and urology referral.  Return precautions discussed       I have reviewed the nursing notes.    I have reviewed the findings, diagnosis, plan and need for follow up with the patient.       Critical Care time:  none      NEW PRESCRIPTIONS STARTED AT TODAY'S ER VISIT  Discharge Medication List as of 8/30/2024 12:07 AM        START taking these medications    Details   ondansetron (ZOFRAN ODT) 4 MG ODT tab Take 1 tablet (4 mg) by mouth every 8 hours as needed for nausea., Disp-12 tablet, R-0, E-Prescribe      oxyCODONE (ROXICODONE) 5 MG tablet Take 1 tablet (5 mg) by mouth every 4 hours as needed for severe pain. If pain is not improved with acetaminophen and ibuprofen., Disp-12 tablet, R-0, E-Prescribe      tamsulosin (FLOMAX) 0.4 MG capsule Take 1 capsule (0.4 mg) by mouth daily for 7 days., Disp-7 capsule, R-0, E-Prescribe             Final diagnoses:   Left ureteral stone   Flank pain   Nausea and vomiting, unspecified vomiting type       8/29/2024   [facility] EMERGENCY DEPT       Ryley, Jermaine Peace MD  08/30/24 0050

## 2024-08-30 NOTE — ED TRIAGE NOTES
Pt arrived via private car with spouse. Pt presented with intermittent N/T to bilateral hands, and LLQ abd that started at 1800 with radiation into back. Pt reports N/V, diaphoretic. Pt denied fevers, loose stools, Cp, SOB, urinary symptoms. Pt has cardiac hx with stent placement in 5/23.      Triage Assessment (Adult)       Row Name 08/29/24 0587          Triage Assessment    Airway WDL WDL        Respiratory WDL    Respiratory WDL WDL        Skin Circulation/Temperature WDL    Skin Circulation/Temperature WDL WDL        Cardiac WDL    Cardiac WDL WDL        Peripheral/Neurovascular WDL    Peripheral Neurovascular WDL WDL        Cognitive/Neuro/Behavioral WDL    Cognitive/Neuro/Behavioral WDL WDL

## 2025-02-24 ENCOUNTER — TELEPHONE (OUTPATIENT)
Dept: FAMILY MEDICINE | Facility: CLINIC | Age: 65
End: 2025-02-24

## 2025-02-24 NOTE — TELEPHONE ENCOUNTER
Patient Quality Outreach    Patient is due for the following:   Hypertension -  BP check and Hypertension follow-up visit  Physical Preventive Adult Physical    Action(s) Taken:   Schedule a office visit for   Adult Preventative    Type of outreach:    Sent letter.    Questions for provider review:    None           Griselda Barry, WES  Chart routed to .

## 2025-02-24 NOTE — LETTER
February 24, 2025      Noam Del Real  [address] [place] [id number]        Dear Noam,    In order to ensure we are providing the best quality care, Dr. Whipple and I have reviewed your chart and see that you are due for a yearly Physical and blood pressure check.    Please call the clinic to set up an office visit at [phone] at your earliest convenience.    We greatly appreciate the opportunity to serve you. Thank you for trusting us with your health care.    Sincerely,    Josiane Whipple M.D.  Griselda BADILLO, CMA

## (undated) DEVICE — SHTH INTRO 0.021IN ID 6FR DIA

## (undated) DEVICE — CATH DIAG RADIAL 5FR TIG 4.5 100CM

## (undated) DEVICE — SYR ANGIOGRAPHY MULTIUSE KIT ACIST 014612

## (undated) DEVICE — KIT HAND CONTROL ACIST 014644 AR-P54

## (undated) DEVICE — CATH LAUNCHER 6FR EBU 4.0 LA6EBU40

## (undated) DEVICE — DEVICE INFLATION SYR W/ HEMOSTASIS VALVE 12IN EXT IN4904

## (undated) DEVICE — CATH BALLOON NC EMERGE 3.00X12MM H7493926712300

## (undated) DEVICE — GUIDEWIRE INQWIRE 3MM J TIP .035 260CM

## (undated) DEVICE — CATH BALLOON EMERGE 2.5X15MM H7493918915250

## (undated) DEVICE — CUSTOM PACK CORONARY SAN5BCRHEA

## (undated) DEVICE — ELECTRODE ADULT PACING MULTI P-211-M1

## (undated) DEVICE — SLEEVE TR BAND RADIAL COMPRESSION DEVICE 24CM TRB24-REG

## (undated) DEVICE — WIRE GUIDE HI-TRQ  WHISPER MS JTIP 0.014"X190CM 1005357HJ

## (undated) RX ORDER — NITROGLYCERIN 5 MG/ML
VIAL (ML) INTRAVENOUS
Status: DISPENSED
Start: 2023-05-02

## (undated) RX ORDER — DIAZEPAM 10 MG
TABLET ORAL
Status: DISPENSED
Start: 2023-05-02

## (undated) RX ORDER — ASPIRIN 81 MG/1
TABLET, CHEWABLE ORAL
Status: DISPENSED
Start: 2023-05-02

## (undated) RX ORDER — FENTANYL CITRATE 50 UG/ML
INJECTION, SOLUTION INTRAMUSCULAR; INTRAVENOUS
Status: DISPENSED
Start: 2023-05-02

## (undated) RX ORDER — ASPIRIN 81 MG/1
TABLET ORAL
Status: DISPENSED
Start: 2023-05-02

## (undated) RX ORDER — CLOPIDOGREL 300 MG/1
TABLET, FILM COATED ORAL
Status: DISPENSED
Start: 2023-05-02